# Patient Record
Sex: FEMALE | Race: WHITE | NOT HISPANIC OR LATINO | Employment: OTHER | URBAN - METROPOLITAN AREA
[De-identification: names, ages, dates, MRNs, and addresses within clinical notes are randomized per-mention and may not be internally consistent; named-entity substitution may affect disease eponyms.]

---

## 2024-03-26 ENCOUNTER — HOSPITAL ENCOUNTER (OUTPATIENT)
Dept: RADIOLOGY | Facility: HOSPITAL | Age: 64
Discharge: HOME | End: 2024-03-26
Payer: MEDICARE

## 2024-03-26 DIAGNOSIS — Z78.0 ASYMPTOMATIC MENOPAUSAL STATE: ICD-10-CM

## 2024-03-26 PROCEDURE — 77085 DXA BONE DENSITY AXL VRT FX: CPT | Performed by: RADIOLOGY

## 2024-03-26 PROCEDURE — 77085 DXA BONE DENSITY AXL VRT FX: CPT

## 2024-05-29 ENCOUNTER — HOSPITAL ENCOUNTER (OUTPATIENT)
Dept: RADIOLOGY | Facility: HOSPITAL | Age: 64
Discharge: HOME | End: 2024-05-29
Payer: MEDICARE

## 2024-05-29 DIAGNOSIS — Z13.6 ENCOUNTER FOR SCREENING FOR CARDIOVASCULAR DISORDERS: ICD-10-CM

## 2024-05-29 PROCEDURE — 75571 CT HRT W/O DYE W/CA TEST: CPT

## 2025-03-06 ENCOUNTER — HOSPITAL ENCOUNTER (OUTPATIENT)
Dept: RADIOLOGY | Facility: EXTERNAL LOCATION | Age: 65
Discharge: HOME | End: 2025-03-06

## 2025-03-07 ENCOUNTER — APPOINTMENT (OUTPATIENT)
Dept: RADIOLOGY | Facility: HOSPITAL | Age: 65
End: 2025-03-07
Payer: MEDICARE

## 2025-04-16 ENCOUNTER — HOSPITAL ENCOUNTER (OUTPATIENT)
Dept: RADIOLOGY | Facility: HOSPITAL | Age: 65
Discharge: HOME | End: 2025-04-16
Payer: MEDICARE

## 2025-04-16 VITALS — BODY MASS INDEX: 30.99 KG/M2 | HEIGHT: 65 IN | WEIGHT: 186 LBS

## 2025-04-16 DIAGNOSIS — N63.24 UNSPECIFIED LUMP IN THE LEFT BREAST, LOWER INNER QUADRANT: ICD-10-CM

## 2025-04-16 PROCEDURE — 77062 BREAST TOMOSYNTHESIS BI: CPT

## 2025-04-16 PROCEDURE — 76982 USE 1ST TARGET LESION: CPT | Mod: LT

## 2025-04-16 PROCEDURE — 76642 ULTRASOUND BREAST LIMITED: CPT | Mod: LT

## 2025-04-21 NOTE — PROGRESS NOTES
Subjective   Patient ID: Jacklyn Faria is a 64 y.o. female who presents for Left US breast biopsy consult, bx on 4/25.  HPI  Patient is new to clinic and presents for Left US breast biopsy consult, bx on 4/25.  Patient is referred by Dr. Lauren Bhagat.  Patient seen Dr. Bhagat in the office on 3/25/2025 for annual exam. She has identified a palpable mass in her breast, which she suspects might be a cyst. The mass is particularly noticeable when she assumes a lateral position. Additionally, she has detected several smaller masses, the nature of which she is uncertain. She has undergone numerous breast examinations in the past, with no prior mention of an inverted nipple. The breast exam revealed bilateral flat nipples. A mass is present in the lower inner quadrant of the right breast at the 6 o'clock position. Orders were placed for imaging to be completed.  Patient had BI mammogram bilateral diagnostic tomosynthesis and BI US breast limited left completed on 4/16/2025. Impression was suspicious palpable left breast mass with possible extension to the skin associated with a single prominent internal mammary lymph node without axillary lymphadenopathy. Further evaluation with surgical consultation and ultrasound-guided biopsy is recommended. No mammographic evidence of malignancy in the right breast.   Patient indicates she first felt the lump in February of this year.  She has had no previous breast biopsies.  She does not think the lump is changed since she first noticed it.  No nipple discharge.  BI MAMMO BILATERAL DIAGNOSTIC TOMOSYNTHESIS; BI US BREAST LIMITED  LEFT;  4/16/2025 2:42 pm; 4/16/2025 3:37 pm      ORDERING CLINICIAN:  LAUREN BHAGAT      INDICATION:  Diagnostic evaluation of palpable area of clinical concern in the  left breast felt by the patient for 2 months.      ,N63.24 Unspecified lump in the left breast, lower inner quadrant      COMPARISON:  10/12/2020, 09/24/2020, 01/18/2008, 12/18/2007.       FINDINGS:  MAMMOGRAPHY: 2D and tomosynthesis images were reviewed at 1 mm slice  thickness.      Density:  There are scattered areas of fibroglandular density.      A radiopaque marker was placed by the patient on the skin at the site  of the palpable clinical concern in the lower inner quadrant of the  left breast at posterior depth. Underneath the marker, there is an  irregular spiculated high density mass in lower inner left breast at  posterior depth, in the inframammary region. The spiculations of the  mass likely extends to the skin, best appreciated on the spot MLO  view. The most posterior extent of the mass is not well visualized  due to posterior location.      Additional a few bilateral oval circumscribed masses are similar to  prior exams including a stable mass in the right subareolar region.  No suspicious masses or calcifications are identified in the right  breast.      ULTRASOUND: Targeted ultrasound with elastography was performed by a  registered sonographer and the interpreting radiologist in the lower  inner quadrant of the left breast, left axilla and left internal  mammary region.      An irregular ill-defined spiculated hypoechoic mass with echogenic  rind in antiparallel orientation is identified in the left breast at  7 o'clock, 8 cm from the nipple measuring 1.3 x 1.5 x 1.1 cm. The  mass extends to the skin with no focal skin thickness.      On scanning of the left axilla, a single morphologically normal lymph  node with preserved fatty hilum and normal cortical thickness is  identified.      On scanning of the internal mammary region, a prominent internal  mammary lymph node is identified measuring 0.6 cm in long axis with  slightly effaced fatty hilum. No additional abnormal internal mammary  lymph nodes identified.      IMPRESSION:  1. Suspicious palpable left breast mass with possible extension to  the skin associated with a single prominent internal mammary lymph  node without  axillary lymphadenopathy. Further evaluation with  surgical consultation and ultrasound-guided biopsy is recommended.      2. No mammographic evidence of malignancy in the right breast.      Dr. Ted Donato discussed the findings and recommendations with  the patient at the time of exam. A message was sent to the referring  clinician at the time of this dictation regarding these findings  using the Epic critical findings reporting system. A pre-procedure  form was filled out.      Method of Detection: Category Pat - Patient Reported Self-examination  Finding      BI-RADS CATEGORY:  BI-RADS Category:  4 Suspicious.  Recommendation:  Surgical Consultation and Biopsy.  Recommended Date:  Immediate.  Laterality:  Left.      MACRO:  Critical Finding:  Breast Imaging Abnormality. Notification was  initiated on 4/16/2025 at 3:34 pm by  Ted Donato.  (**-YCF-**)  Instructions:  Surgical Consultation and Imaging Guided Biopsy.    Previous Biopsy: NO Menarche Age: 12/15 Age of first delivery: 19.5 Breastfeed: YES Menopause age: 47 HRT: NO Family history of breast cancer: MATERNAL AUNT Family history of ovarian cancer: NO Family history of pancreatic cancer: NO G 2 P 2     Social History:  Tobacco Use - NO  Do you use any recreational drugs - NO  Alcohol Use - NO  Caffeine Intake - YES  Working - RETIRED  Marital status -   Living with -  SPOUSE    Past Medical History:   Diagnosis Date    Breast mass Feb 2025    Colon polyp Jul 2020     Family History   Problem Relation Name Age of Onset    Kidney disease Father Darius Flores     Hyperlipidemia Father Darius Tanner     Heart disease Father Darius Tanner     Blood clot Father Darius Tanner     Alcohol abuse Maternal Grandfather Shimon Esqueda     Cancer Maternal Grandmother Sara Hernandez     Breast cancer Mother's Sister Deborah Danae     Clotting disorder Sister Ashley Byrne     Blood clot Sister Ashley Byrne     Clotting disorder Sister Shania Aguirre     Blood clot Sister  "Shania Aguirre     Blood clot Sister Corinne Abbott     Diabetes Father's Sister Rama Dave     Stroke Father's Sister Rama Dave     Vision loss Father's Sister Rama Dave     Miscarriages / Stillbirths Daughter Bonnie Roman      Past Surgical History:   Procedure Laterality Date    VAGINAL PROLAPSE REPAIR  2012     No Known Allergies    Review of Systems  /75 (BP Location: Right arm, Patient Position: Sitting, BP Cuff Size: Adult)   Pulse 63   Temp 36.7 °C (98.1 °F) (Temporal)   Resp 17   Ht 1.651 m (5' 5\")   Wt 84.4 kg (186 lb)   LMP  (LMP Unknown)   SpO2 100%   BMI 30.95 kg/m²    Objective   Physical Exam  Physical Exam:  /75 (BP Location: Right arm, Patient Position: Sitting, BP Cuff Size: Adult)   Pulse 63   Temp 36.7 °C (98.1 °F) (Temporal)   Resp 17   Ht 1.651 m (5' 5\")   Wt 84.4 kg (186 lb)   LMP  (LMP Unknown)   SpO2 100%   BMI 30.95 kg/m²    GENERAL APPEARANCE: Patient appears in no acute distress.   EYES: Sclera non-icteric, PERRLA.   ENT Normal appearance of ears and nose.   NECK/THYROID: Neck: no masses. Thyroid: no masses.   LYMPH NODES: No cervical or supraclavicular lymphadenopathy.   CARDIOVASCULAR Heart: RRR, no murmurs; Carotid bruits: none; Peripheral edema: none.   RESPIRATORY: Lungs: Bilateral inspiratory and expiratory wheezing; no respiratory distress.   BREASTS: Exam done both in upright and supine position. On inspection no skin dimpling, no peau d'orange; Masses: Patient with hard palpable mass left breast lower inner quadrant close to the inframammary crease and lateral border of the sternum.  It is not fixed..  Axillary lymphadenopathy: none.   GI (ABDOMEN) No intraabdominal mass appreciated, no hepatosplenomegaly; Hernia: none; Tenderness: none.   PSYCH: Patient oriented to time, place and person, normal affect.    Assessment/Plan   Problem List Items Addressed This Visit           ICD-10-CM    Mass of lower inner quadrant of left breast - Primary " N63.24    Patient with a 1.5 cm mass left breast lower inner quadrant recommending ultrasound-guided core biopsy. Patient to be scheduled for ultrasound guided  biopsy of the breast in the radiology department. Risk, benefits and alternatives to this plan were thoroughly discussed with the patient who agrees to proceed.  The procedure was also thoroughly discussed as well as her follow-up. She is to see me in the office in one week but I also will call as soon as I see the pathology which is usually 4 working days from procedure.                  Margarette Webb MA 04/24/25 2:23 PM

## 2025-04-24 ENCOUNTER — OFFICE VISIT (OUTPATIENT)
Dept: SURGERY | Facility: CLINIC | Age: 65
End: 2025-04-24
Payer: MEDICARE

## 2025-04-24 VITALS
WEIGHT: 186 LBS | BODY MASS INDEX: 30.99 KG/M2 | TEMPERATURE: 98.1 F | SYSTOLIC BLOOD PRESSURE: 141 MMHG | HEART RATE: 63 BPM | DIASTOLIC BLOOD PRESSURE: 75 MMHG | OXYGEN SATURATION: 100 % | RESPIRATION RATE: 17 BRPM | HEIGHT: 65 IN

## 2025-04-24 DIAGNOSIS — N63.24 MASS OF LOWER INNER QUADRANT OF LEFT BREAST: Primary | ICD-10-CM

## 2025-04-24 PROCEDURE — 1159F MED LIST DOCD IN RCRD: CPT | Performed by: SURGERY

## 2025-04-24 PROCEDURE — 99214 OFFICE O/P EST MOD 30 MIN: CPT | Performed by: SURGERY

## 2025-04-24 PROCEDURE — 3008F BODY MASS INDEX DOCD: CPT | Performed by: SURGERY

## 2025-04-24 PROCEDURE — 1036F TOBACCO NON-USER: CPT | Performed by: SURGERY

## 2025-04-24 PROCEDURE — 1157F ADVNC CARE PLAN IN RCRD: CPT | Performed by: SURGERY

## 2025-04-24 PROCEDURE — 1126F AMNT PAIN NOTED NONE PRSNT: CPT | Performed by: SURGERY

## 2025-04-24 PROCEDURE — 99204 OFFICE O/P NEW MOD 45 MIN: CPT | Performed by: SURGERY

## 2025-04-24 ASSESSMENT — LIFESTYLE VARIABLES
HOW OFTEN DO YOU HAVE A DRINK CONTAINING ALCOHOL: NEVER
HOW OFTEN DO YOU HAVE SIX OR MORE DRINKS ON ONE OCCASION: NEVER
HOW MANY STANDARD DRINKS CONTAINING ALCOHOL DO YOU HAVE ON A TYPICAL DAY: PATIENT DOES NOT DRINK
SKIP TO QUESTIONS 9-10: 1
AUDIT-C TOTAL SCORE: 0

## 2025-04-24 ASSESSMENT — PAIN SCALES - GENERAL: PAINLEVEL_OUTOF10: 0-NO PAIN

## 2025-04-24 ASSESSMENT — PATIENT HEALTH QUESTIONNAIRE - PHQ9
SUM OF ALL RESPONSES TO PHQ9 QUESTIONS 1 & 2: 0
1. LITTLE INTEREST OR PLEASURE IN DOING THINGS: NOT AT ALL
2. FEELING DOWN, DEPRESSED OR HOPELESS: NOT AT ALL

## 2025-04-24 ASSESSMENT — COLUMBIA-SUICIDE SEVERITY RATING SCALE - C-SSRS
2. HAVE YOU ACTUALLY HAD ANY THOUGHTS OF KILLING YOURSELF?: NO
1. IN THE PAST MONTH, HAVE YOU WISHED YOU WERE DEAD OR WISHED YOU COULD GO TO SLEEP AND NOT WAKE UP?: NO
6. HAVE YOU EVER DONE ANYTHING, STARTED TO DO ANYTHING, OR PREPARED TO DO ANYTHING TO END YOUR LIFE?: NO

## 2025-04-24 ASSESSMENT — ENCOUNTER SYMPTOMS
LOSS OF SENSATION IN FEET: 0
DEPRESSION: 0
OCCASIONAL FEELINGS OF UNSTEADINESS: 0

## 2025-04-24 NOTE — ASSESSMENT & PLAN NOTE
Patient with a 1.5 cm mass left breast lower inner quadrant recommending ultrasound-guided core biopsy. Patient to be scheduled for ultrasound guided  biopsy of the breast in the radiology department. Risk, benefits and alternatives to this plan were thoroughly discussed with the patient who agrees to proceed.  The procedure was also thoroughly discussed as well as her follow-up. She is to see me in the office in one week but I also will call as soon as I see the pathology which is usually 4 working days from procedure.

## 2025-04-25 ENCOUNTER — HOSPITAL ENCOUNTER (OUTPATIENT)
Dept: RADIOLOGY | Facility: CLINIC | Age: 65
Discharge: HOME | End: 2025-04-25
Payer: MEDICARE

## 2025-04-25 DIAGNOSIS — N63.0 BREAST MASS: Primary | ICD-10-CM

## 2025-04-25 DIAGNOSIS — N63.24 MASS OF LOWER INNER QUADRANT OF LEFT BREAST: ICD-10-CM

## 2025-04-25 DIAGNOSIS — R92.8 OTHER ABNORMAL AND INCONCLUSIVE FINDINGS ON DIAGNOSTIC IMAGING OF BREAST: ICD-10-CM

## 2025-04-25 DIAGNOSIS — N63.0 BREAST MASS: ICD-10-CM

## 2025-04-25 PROCEDURE — 2720000007 HC OR 272 NO HCPCS

## 2025-04-25 PROCEDURE — 2500000004 HC RX 250 GENERAL PHARMACY W/ HCPCS (ALT 636 FOR OP/ED): Mod: JZ | Performed by: STUDENT IN AN ORGANIZED HEALTH CARE EDUCATION/TRAINING PROGRAM

## 2025-04-25 PROCEDURE — 19083 BX BREAST 1ST LESION US IMAG: CPT | Mod: LT

## 2025-04-25 PROCEDURE — C1819 TISSUE LOCALIZATION-EXCISION: HCPCS

## 2025-04-25 PROCEDURE — 2780000003 HC OR 278 NO HCPCS

## 2025-04-25 PROCEDURE — C1739 HC OR 272 NO HCPCS: HCPCS

## 2025-04-25 PROCEDURE — 2500000004 HC RX 250 GENERAL PHARMACY W/ HCPCS (ALT 636 FOR OP/ED): Performed by: RADIOLOGY

## 2025-04-25 RX ORDER — CHLOROPROCAINE HYDROCHLORIDE 20 MG/ML
7.5 INJECTION, SOLUTION EPIDURAL; INFILTRATION; INTRACAUDAL; PERINEURAL ONCE
Status: COMPLETED | OUTPATIENT
Start: 2025-04-25 | End: 2025-04-25

## 2025-04-25 RX ADMIN — CHLOROPROCAINE HYDROCHLORIDE 150 MG: 20 INJECTION, SOLUTION EPIDURAL; INFILTRATION; INTRACAUDAL; PERINEURAL at 14:25

## 2025-04-25 ASSESSMENT — PAIN SCALES - GENERAL
PAINLEVEL_OUTOF10: 0 - NO PAIN

## 2025-04-25 ASSESSMENT — PAIN - FUNCTIONAL ASSESSMENT
PAIN_FUNCTIONAL_ASSESSMENT: 0-10

## 2025-04-30 LAB
LAB AP ASR DISCLAIMER: NORMAL
LAB AP BLOCK FOR ADDITIONAL STUDIES: NORMAL
LABORATORY COMMENT REPORT: NORMAL
PATH REPORT.FINAL DX SPEC: NORMAL
PATH REPORT.GROSS SPEC: NORMAL
PATH REPORT.RELEVANT HX SPEC: NORMAL
PATH REPORT.TOTAL CANCER: NORMAL
PATHOLOGY SYNOPTIC REPORT: NORMAL

## 2025-05-05 ENCOUNTER — TELEPHONE (OUTPATIENT)
Dept: SURGERY | Facility: CLINIC | Age: 65
End: 2025-05-05
Payer: MEDICARE

## 2025-05-06 NOTE — PROGRESS NOTES
"Subjective   Patient ID: Jacklyn Faria \"Kb" is a 64 y.o. female who presents for discussion of Left US breast biopsy results, bx on 4/25.  HPI  Patient returns to clinic and presents for discussion of Left US breast biopsy results, bx on 4/25.  Patient was last seen in clinic on 4/24/2025 for Left US breast biopsy consult, bx on 4/25.   Surgical pathology was collected and finalized.    FINAL DIAGNOSIS   A. Left breast, 7:00, 8 cm from nipple, ultrasound guided core needle biopsy:      -- Invasive ductal carcinoma with mucinous features, grade 2, see note.     Note:  In this limited sample, the invasive carcinoma measures up to 9 mm in greatest dimension.  See synoptic biomarker summary.     : Dr Debora Martin     Electronically signed by Mary Hammond MD on 4/30/2025 at 1404 EDT      [facility]   By the signature on this report, the individual or group listed as making the Final Interpretation/Diagnosis certifies that they have reviewed this case.    Case Summary Report   Breast Biomarker Reporting Template   Protocol posted: 12/13/2023BREAST BIOMARKER REPORTING TEMPLATE - All Specimens  Test(s) Performed     Estrogen Receptor (ER) Status  Positive (greater than 10% of cells demonstrate nuclear positivity)   Percentage of Cells with Nuclear Positivity  %   Average Intensity of Staining  Strong   Test Type  Food and Drug Administration (FDA) cleared (test / vendor): Roche CONFIRM anti-(ER) (SP1) Rabbit Monoclonal Primary Antibody, Roche Multimer Detection   Primary Antibody  SP1   Scoring System  No separate scoring system used   Test(s) Performed     Progesterone Receptor (PgR) Status  Positive   Percentage of Cells with Nuclear Positivity  %   Average Intensity of Staining  Strong   Test Type  Food and Drug Administration (FDA) cleared (test / vendor): Roche CONFIRM anti-(PA) (1E2) Rabbit Monoclonal Primary Anitbody, Roche Multimer Detection   Primary Antibody  1E2   Scoring " System  No separate scoring system used   Test(s) Performed     HER2 by Immunohistochemistry  Negative (Score 1+)   Test Type  Food and Drug Administration (FDA) cleared (test / vendor): Roche Pathway anti-HER-2/henry (4B5) Rabbit Monoclonal Primary Antibody, Roche Multimer Detection   Primary Antibody  4B5   Cold Ischemia and Fixation Times  Meet requirements specified in latest version of the ASCO / CAP Guidelines   Testing Performed on Block Number(s)  A1   METHODS   Fixative  Formalin   Image Analysis  Not performed   .      Block for Additional Biomarkers/Molecular Studies  [facility]   Tumor Block: A1         Social History:  Tobacco Use - NO  Do you use any recreational drugs - NO  Alcohol Use - NO  Caffeine Intake - YES  Working - RETIRED  Marital status -   Living with -  SPOUSE    Past Medical History:   Diagnosis Date    Breast mass Feb 2025    Colon polyp Jul 2020     Family History   Problem Relation Name Age of Onset    Kidney disease Father Darius Tanner     Hyperlipidemia Father Darius Tanner     Heart disease Father Darius Tanner     Blood clot Father Darius Tanner     Alcohol abuse Maternal Grandfather Shimon Esqueda     Cancer Maternal Grandmother Sara Hernandez     Breast cancer Mother's Sister Deborah Fink     Clotting disorder Sister Ashley Byrne     Blood clot Sister Ashley Byrne     Clotting disorder Sister Shania Aguirre     Blood clot Sister Shania Aguirre     Blood clot Sister Corinne Abbott     Diabetes Father's Sister Rama Dave     Stroke Father's Sister Rama Dave     Vision loss Father's Sister Rama Jolie     Miscarriages / Stillbirths Daughter Bonnie Roman      Past Surgical History:   Procedure Laterality Date    US BREAST CORE BIOPSY Left 04/24/2025    VAGINAL PROLAPSE REPAIR  2012     Allergies   Allergen Reactions    Lidocaine Other     Per pt had racing heart rate after given at dentist    Other Reaction(s): Other      Per pt had racing heart rate after given at dentist       Review  "of Systems  /70 (BP Location: Left arm, Patient Position: Sitting, BP Cuff Size: Adult)   Pulse 75   Temp 36.7 °C (98 °F) (Temporal)   Resp 17   Ht 1.651 m (5' 5\")   Wt 84.4 kg (186 lb)   LMP  (LMP Unknown)   SpO2 99%   BMI 30.95 kg/m²    Objective   Physical Exam  Biopsy site left breast healed nicely  Assessment/Plan   Problem List Items Addressed This Visit           ICD-10-CM    Invasive ductal carcinoma of breast, female, left - Primary C50.912    Patient with 1.5 cm invasive ductal carcinoma that is ER/CT positive HER2/henry negative grade 2 out of 3 in the left breast lower medial quadrant. Recommend magseed localization and lumpectomy with sentinal node biopsy verses mastectomy +/- reconstruction with sentinal node biopsy. Approximately 40 minutes spent with patient discussing her tumor, its characteristics and her treatment options. She will ultimately meet with oncology post surgery, and  radiation oncology depending on her surgical treatment choice and its outcome.  Risks benefits and alternatives to surgery were thoroughly discussed with the patient who agrees to proceed.  Patient is agreeable to proceed with the Magseed localization lumpectomy and sentinel node biopsy.         Family history of malignant neoplasm of breast Z80.3    Patient is a candidate for genetic testing.             Breast History:    Patient is new to clinic and presents for Left US breast biopsy consult, bx on 4/25.  Patient is referred by Dr. Lauren Bhagat.  Patient seen Dr. Bhagat in the office on 3/25/2025 for annual exam. She has identified a palpable mass in her breast, which she suspects might be a cyst. The mass is particularly noticeable when she assumes a lateral position. Additionally, she has detected several smaller masses, the nature of which she is uncertain. She has undergone numerous breast examinations in the past, with no prior mention of an inverted nipple. The breast exam revealed bilateral flat " nipples. A mass is present in the lower inner quadrant of the right breast at the 6 o'clock position. Orders were placed for imaging to be completed.  Patient had BI mammogram bilateral diagnostic tomosynthesis and BI US breast limited left completed on 4/16/2025. Impression was suspicious palpable left breast mass with possible extension to the skin associated with a single prominent internal mammary lymph node without axillary lymphadenopathy. Further evaluation with surgical consultation and ultrasound-guided biopsy is recommended. No mammographic evidence of malignancy in the right breast.   Patient indicates she first felt the lump in February of this year.  She has had no previous breast biopsies.  She does not think the lump is changed since she first noticed it.  No nipple discharge.  Patient with a 1.5 cm mass left breast lower inner quadrant recommending ultrasound-guided core biopsy. Patient to be scheduled for ultrasound guided  biopsy of the breast in the radiology department. Risk, benefits and alternatives to this plan were thoroughly discussed with the patient who agrees to proceed.  The procedure was also thoroughly discussed as well as her follow-up. She is to see me in the office in one week but I also will call as soon as I see the pathology which is usually 4 working days from procedure.   Previous Biopsy: NO Menarche Age: 12/15 Age of first delivery: 19.5 Breastfeed: YES Menopause age: 47 HRT: NO Family history of breast cancer: MATERNAL AUNT Family history of ovarian cancer: NO Family history of pancreatic cancer: NO G 2 P 2     Margarette Webb MA 05/12/25 3:10 PM

## 2025-05-12 ENCOUNTER — OFFICE VISIT (OUTPATIENT)
Dept: SURGERY | Facility: CLINIC | Age: 65
End: 2025-05-12
Payer: MEDICARE

## 2025-05-12 VITALS
OXYGEN SATURATION: 99 % | HEART RATE: 75 BPM | DIASTOLIC BLOOD PRESSURE: 70 MMHG | WEIGHT: 186 LBS | TEMPERATURE: 98 F | SYSTOLIC BLOOD PRESSURE: 121 MMHG | RESPIRATION RATE: 17 BRPM | HEIGHT: 65 IN | BODY MASS INDEX: 30.99 KG/M2

## 2025-05-12 DIAGNOSIS — C50.912 INVASIVE DUCTAL CARCINOMA OF BREAST, FEMALE, LEFT: Primary | ICD-10-CM

## 2025-05-12 DIAGNOSIS — Z80.3 FAMILY HISTORY OF MALIGNANT NEOPLASM OF BREAST: ICD-10-CM

## 2025-05-12 PROCEDURE — 1126F AMNT PAIN NOTED NONE PRSNT: CPT | Performed by: SURGERY

## 2025-05-12 PROCEDURE — 99214 OFFICE O/P EST MOD 30 MIN: CPT | Performed by: SURGERY

## 2025-05-12 PROCEDURE — 1036F TOBACCO NON-USER: CPT | Performed by: SURGERY

## 2025-05-12 PROCEDURE — 3008F BODY MASS INDEX DOCD: CPT | Performed by: SURGERY

## 2025-05-12 PROCEDURE — 1159F MED LIST DOCD IN RCRD: CPT | Performed by: SURGERY

## 2025-05-12 RX ORDER — CEFAZOLIN SODIUM 2 G/100ML
2 INJECTION, SOLUTION INTRAVENOUS ONCE
OUTPATIENT
Start: 2025-05-12 | End: 2025-05-12

## 2025-05-12 ASSESSMENT — COLUMBIA-SUICIDE SEVERITY RATING SCALE - C-SSRS
1. IN THE PAST MONTH, HAVE YOU WISHED YOU WERE DEAD OR WISHED YOU COULD GO TO SLEEP AND NOT WAKE UP?: NO
6. HAVE YOU EVER DONE ANYTHING, STARTED TO DO ANYTHING, OR PREPARED TO DO ANYTHING TO END YOUR LIFE?: NO
2. HAVE YOU ACTUALLY HAD ANY THOUGHTS OF KILLING YOURSELF?: NO

## 2025-05-12 ASSESSMENT — ENCOUNTER SYMPTOMS
LOSS OF SENSATION IN FEET: 0
OCCASIONAL FEELINGS OF UNSTEADINESS: 0
DEPRESSION: 0

## 2025-05-12 ASSESSMENT — LIFESTYLE VARIABLES
SKIP TO QUESTIONS 9-10: 1
AUDIT-C TOTAL SCORE: 0
HOW MANY STANDARD DRINKS CONTAINING ALCOHOL DO YOU HAVE ON A TYPICAL DAY: PATIENT DOES NOT DRINK
HOW OFTEN DO YOU HAVE A DRINK CONTAINING ALCOHOL: NEVER
HOW OFTEN DO YOU HAVE SIX OR MORE DRINKS ON ONE OCCASION: NEVER

## 2025-05-12 ASSESSMENT — PATIENT HEALTH QUESTIONNAIRE - PHQ9
1. LITTLE INTEREST OR PLEASURE IN DOING THINGS: NOT AT ALL
2. FEELING DOWN, DEPRESSED OR HOPELESS: NOT AT ALL
SUM OF ALL RESPONSES TO PHQ9 QUESTIONS 1 & 2: 0

## 2025-05-12 ASSESSMENT — PAIN SCALES - GENERAL: PAINLEVEL_OUTOF10: 0-NO PAIN

## 2025-05-12 NOTE — ASSESSMENT & PLAN NOTE
Patient with 1.5 cm invasive ductal carcinoma that is ER/RI positive HER2/henry negative grade 2 out of 3 in the left breast lower medial quadrant. Recommend magseed localization and lumpectomy with sentinal node biopsy verses mastectomy +/- reconstruction with sentinal node biopsy. Approximately 40 minutes spent with patient discussing her tumor, its characteristics and her treatment options. She will ultimately meet with oncology post surgery, and  radiation oncology depending on her surgical treatment choice and its outcome.  Risks benefits and alternatives to surgery were thoroughly discussed with the patient who agrees to proceed.  Patient is agreeable to proceed with the Magseed localization lumpectomy and sentinel node biopsy.

## 2025-05-13 ENCOUNTER — LAB (OUTPATIENT)
Dept: LAB | Facility: HOSPITAL | Age: 65
End: 2025-05-13
Payer: MEDICARE

## 2025-05-13 DIAGNOSIS — Z80.2 FAMILY HISTORY OF MALIGNANT NEOPLASM OF OTHER RESPIRATORY AND INTRATHORACIC ORGANS: Primary | ICD-10-CM

## 2025-05-22 DIAGNOSIS — C50.912 INVASIVE DUCTAL CARCINOMA OF BREAST, FEMALE, LEFT: Primary | ICD-10-CM

## 2025-06-02 VITALS — BODY MASS INDEX: 29.16 KG/M2 | WEIGHT: 175 LBS | HEIGHT: 65 IN

## 2025-06-04 ENCOUNTER — HOSPITAL ENCOUNTER (OUTPATIENT)
Dept: RADIOLOGY | Facility: CLINIC | Age: 65
Discharge: HOME | End: 2025-06-04
Payer: MEDICARE

## 2025-06-04 DIAGNOSIS — C50.912 INVASIVE DUCTAL CARCINOMA OF BREAST, FEMALE, LEFT: ICD-10-CM

## 2025-06-04 DIAGNOSIS — N63.20 LEFT BREAST MASS: ICD-10-CM

## 2025-06-04 PROCEDURE — 2780000003 HC OR 278 NO HCPCS

## 2025-06-04 PROCEDURE — 19285 PERQ DEV BREAST 1ST US IMAG: CPT | Mod: LT

## 2025-06-04 PROCEDURE — C1739 HC OR 278 NO HCPCS: HCPCS

## 2025-06-04 PROCEDURE — 2500000004 HC RX 250 GENERAL PHARMACY W/ HCPCS (ALT 636 FOR OP/ED): Performed by: RADIOLOGY

## 2025-06-04 RX ORDER — CHLOROPROCAINE HYDROCHLORIDE 20 MG/ML
10 INJECTION, SOLUTION EPIDURAL; INFILTRATION; INTRACAUDAL; PERINEURAL ONCE
Status: COMPLETED | OUTPATIENT
Start: 2025-06-04 | End: 2025-06-04

## 2025-06-04 RX ADMIN — CHLOROPROCAINE HYDROCHLORIDE 100 MG: 20 INJECTION, SOLUTION EPIDURAL; INFILTRATION; INTRACAUDAL; PERINEURAL at 08:19

## 2025-06-04 ASSESSMENT — PAIN SCALES - GENERAL
PAINLEVEL_OUTOF10: 0 - NO PAIN
PAINLEVEL_OUTOF10: 1
PAINLEVEL_OUTOF10: 0 - NO PAIN

## 2025-06-04 NOTE — Clinical Note
Patient expressed anxiety regarding procedure.  Patient's surgery is scheduled for August.  Nurse and tech provided active listening and supportive feedback and reassurance.  Patient given option to reschedule procedure for another day.  Patient expressed appreciation for support and stated she would like to continue with magseed procedure.  All questions answered.

## 2025-06-04 NOTE — DISCHARGE INSTRUCTIONS
Post procedure care reviewed with patient, ok to resume normal activity with no restrictions. Patient verbalized understanding and will follow up surgery as planned.   Patient left breast center ambulatory at 0830.

## 2025-06-11 ENCOUNTER — TELEPHONE (OUTPATIENT)
Dept: SURGERY | Facility: CLINIC | Age: 65
End: 2025-06-11
Payer: MEDICARE

## 2025-06-11 NOTE — TELEPHONE ENCOUNTER
Called [employer] insurance today in regards to fax that was received in regards to this patients genetic testing being denied. Had to leave a voicemail for Marlo in regards to this matter. Dr. Selby states she is not going to do a peer to peer since this should be covered due to patients family history and her own history of breast cancer. Will wait for a call back from Marlo from [employer] in regards to this matter.    Phoebe Gaviria CMA

## 2025-06-18 ENCOUNTER — PATIENT MESSAGE (OUTPATIENT)
Dept: SURGERY | Facility: CLINIC | Age: 65
End: 2025-06-18
Payer: MEDICARE

## 2025-07-21 ENCOUNTER — PRE-ADMISSION TESTING (OUTPATIENT)
Dept: PREADMISSION TESTING | Facility: HOSPITAL | Age: 65
End: 2025-07-21
Payer: MEDICARE

## 2025-07-21 VITALS
HEART RATE: 63 BPM | BODY MASS INDEX: 27.99 KG/M2 | OXYGEN SATURATION: 99 % | WEIGHT: 168 LBS | SYSTOLIC BLOOD PRESSURE: 111 MMHG | DIASTOLIC BLOOD PRESSURE: 63 MMHG | TEMPERATURE: 96.8 F | HEIGHT: 65 IN | RESPIRATION RATE: 16 BRPM

## 2025-07-21 DIAGNOSIS — Z01.818 PRE-OP TESTING: Primary | ICD-10-CM

## 2025-07-21 LAB
ANION GAP SERPL CALCULATED.3IONS-SCNC: 11 MMOL/L (ref 10–20)
BASOPHILS # BLD AUTO: 0.07 X10*3/UL (ref 0–0.1)
BASOPHILS NFR BLD AUTO: 1.3 %
BUN SERPL-MCNC: 14 MG/DL (ref 6–23)
CALCIUM SERPL-MCNC: 9.8 MG/DL (ref 8.6–10.3)
CHLORIDE SERPL-SCNC: 105 MMOL/L (ref 98–107)
CO2 SERPL-SCNC: 30 MMOL/L (ref 21–32)
CREAT SERPL-MCNC: 0.89 MG/DL (ref 0.5–1.05)
EGFRCR SERPLBLD CKD-EPI 2021: 72 ML/MIN/1.73M*2
EOSINOPHIL # BLD AUTO: 0.14 X10*3/UL (ref 0–0.7)
EOSINOPHIL NFR BLD AUTO: 2.5 %
ERYTHROCYTE [DISTWIDTH] IN BLOOD BY AUTOMATED COUNT: 14.6 % (ref 11.5–14.5)
GLUCOSE SERPL-MCNC: 99 MG/DL (ref 74–99)
HCT VFR BLD AUTO: 44.7 % (ref 36–46)
HGB BLD-MCNC: 14.3 G/DL (ref 12–16)
IMM GRANULOCYTES # BLD AUTO: 0.01 X10*3/UL (ref 0–0.7)
IMM GRANULOCYTES NFR BLD AUTO: 0.2 % (ref 0–0.9)
LYMPHOCYTES # BLD AUTO: 2.16 X10*3/UL (ref 1.2–4.8)
LYMPHOCYTES NFR BLD AUTO: 38.7 %
MCH RBC QN AUTO: 29.4 PG (ref 26–34)
MCHC RBC AUTO-ENTMCNC: 32 G/DL (ref 32–36)
MCV RBC AUTO: 92 FL (ref 80–100)
MONOCYTES # BLD AUTO: 0.51 X10*3/UL (ref 0.1–1)
MONOCYTES NFR BLD AUTO: 9.1 %
NEUTROPHILS # BLD AUTO: 2.69 X10*3/UL (ref 1.2–7.7)
NEUTROPHILS NFR BLD AUTO: 48.2 %
NRBC BLD-RTO: 0 /100 WBCS (ref 0–0)
PLATELET # BLD AUTO: 266 X10*3/UL (ref 150–450)
POTASSIUM SERPL-SCNC: 5.2 MMOL/L (ref 3.5–5.3)
RBC # BLD AUTO: 4.86 X10*6/UL (ref 4–5.2)
SODIUM SERPL-SCNC: 141 MMOL/L (ref 136–145)
WBC # BLD AUTO: 5.6 X10*3/UL (ref 4.4–11.3)

## 2025-07-21 PROCEDURE — 85025 COMPLETE CBC W/AUTO DIFF WBC: CPT

## 2025-07-21 PROCEDURE — 36415 COLL VENOUS BLD VENIPUNCTURE: CPT

## 2025-07-21 PROCEDURE — 99203 OFFICE O/P NEW LOW 30 MIN: CPT | Performed by: PHYSICIAN ASSISTANT

## 2025-07-21 PROCEDURE — 80048 BASIC METABOLIC PNL TOTAL CA: CPT

## 2025-07-21 RX ORDER — NALTREXONE HCL 4.5 MG
1 CAPSULE ORAL DAILY
COMMUNITY

## 2025-07-21 RX ORDER — VIT C/E/ZN/COPPR/LUTEIN/ZEAXAN 250MG-90MG
125 CAPSULE ORAL DAILY
COMMUNITY

## 2025-07-21 RX ORDER — MAGNESIUM OXIDE 420 MG/1
1 TABLET ORAL DAILY
COMMUNITY

## 2025-07-21 RX ORDER — MULTIVITAMIN/IRON/FOLIC ACID 18MG-0.4MG
1 TABLET ORAL DAILY
COMMUNITY

## 2025-07-21 ASSESSMENT — ENCOUNTER SYMPTOMS
NEUROLOGICAL NEGATIVE: 1
CARDIOVASCULAR NEGATIVE: 1
CONSTIPATION: 1
CONSTITUTIONAL NEGATIVE: 1
MUSCULOSKELETAL NEGATIVE: 1
RESPIRATORY NEGATIVE: 1
PSYCHIATRIC NEGATIVE: 1
EYES NEGATIVE: 1

## 2025-07-21 NOTE — H&P (VIEW-ONLY)
"CPM/PAT Evaluation       Name: Jacklyn Faria (Jacklyn Faria \"Pura\")  /Age: 1960/64 y.o.     In-Person       Chief Complaint:  \"having breast surgery\"    HPI  The patient is a 64 year old female.  She states she has had intermittent mammograms over the past several years.  Her last mammogram was in .  In  she felt a lump in the left breast.  She denies breast pain, swelling, nipple discharge or skin changes.  Her family history includes a maternal aunt who was diagnosed with breast cancer at age 59 and is living.   The patient saw her PCP and a mammogram showed a suspicious lesion in the left breast.  On 2025 a left breast biopsy showed invasive ductal carcinoma.  She was referred to Dr. Selby and surgical intervention is recommended at this time.    Past Medical History:   Diagnosis Date    Breast mass 2025    Cataract 2024    Beginning signs    Colon polyp 2020    Fractures 2022    Broken clavicle       Past Surgical History:   Procedure Laterality Date    BLADDER SUSPENSION  [place]     COLONOSCOPY  2025    UPPER GASTROINTESTINAL ENDOSCOPY  2020    Signs of GERD    US BREAST CORE BIOPSY Left 2025    VAGINAL PROLAPSE REPAIR  2012       Family History   Problem Relation Name Age of Onset    Kidney disease Father Darius Tanner 60 - 69    Hyperlipidemia Father Darius Tanner 40 - 49    Heart disease Father Darius Tanner 40 - 49    Blood clot Father Darius Tanner     Alcohol abuse Maternal Grandfather Shimon Esqueda     Cancer Maternal Grandmother Sara Hernandez 68    Breast cancer Mother's Sister Deborah Danae     Clotting disorder Sister Ashley Byrne     Blood clot Sister Ashley Chavez     Clotting disorder Sister Shania Timothy     Blood clot Sister Shania Timothy     Blood clot Sister Corinne Abbott     Diabetes Father's Sister Arma Jolie 70 - 79    Stroke Father's Sister Rama Brysonulk 70 - 79    Vision loss Father's Sister Rama Brysonulk 70 - 79    " "Miscarriages / Stillbirths Daughter Bonnie Roman 30 - 39    Cancer Mother's Sister Leticia Fink 59     Social History     Tobacco Use    Smoking status: Never     Passive exposure: Never    Smokeless tobacco: Never    Tobacco comments:     exposed to second hand smoke as a child   Substance Use Topics    Alcohol use: Never     Social History     Substance and Sexual Activity   Drug Use Never     Allergies   Allergen Reactions    Lidocaine Other     Per pt had racing heart rate after given at dentist    Other Reaction(s): Other      Per pt had racing heart rate after given at dentist       Current Outpatient Medications   Medication Sig Dispense Refill    b complex 0.4 mg tablet Take 1 tablet by mouth once daily.      cholecalciferol (Vitamin D-3) 125 mcg (5,000 units) capsule Take 1 capsule (125 mcg) by mouth once daily.      magnesium oxide (Mag-Ox) 420 mg tablet Take 1 tablet (420 mg) by mouth once daily.      naltrexone (Naltrex) 4.5 mg capsule Take 1 tablet by mouth once daily.      NON FORMULARY once daily. VITAMIN K2 300MG      NON FORMULARY once daily. PQQ SUPPLEMENT      OMEGA-3-DHA-EPA-DPA ORAL Take 1 capsule by mouth 2 times a day.      QUERCETIN ORAL Take 1 tablet by mouth 2 times a day.      SELENIUM ORAL Take 1 tablet by mouth once daily. 380MG      ZINC ORAL Take 1 tablet by mouth once daily. 30 MG       No current facility-administered medications for this visit.     Review of Systems   Constitutional: Negative.    HENT: Negative.     Eyes: Negative.    Respiratory: Negative.     Cardiovascular: Negative.    Gastrointestinal:  Positive for constipation.   Genitourinary: Negative.    Musculoskeletal: Negative.    Neurological: Negative.    Psychiatric/Behavioral: Negative.       /63   Pulse 63   Temp 36 °C (96.8 °F) (Temporal)   Resp 16   Ht 1.651 m (5' 5\")   Wt 76.2 kg (168 lb)   LMP  (LMP Unknown)   SpO2 99%   BMI 27.96 kg/m²     Physical Exam  Vitals reviewed.   Constitutional:       " Appearance: Normal appearance.   HENT:      Head: Normocephalic and atraumatic.      Mouth/Throat:      Mouth: Mucous membranes are moist.      Pharynx: Oropharynx is clear.     Eyes:      Extraocular Movements: Extraocular movements intact.      Pupils: Pupils are equal, round, and reactive to light.       Cardiovascular:      Rate and Rhythm: Normal rate and regular rhythm.      Heart sounds: Normal heart sounds.   Pulmonary:      Effort: Pulmonary effort is normal.      Breath sounds: Normal breath sounds.   Abdominal:      General: There is no distension.      Palpations: Abdomen is soft.     Musculoskeletal:         General: No swelling.     Skin:     General: Skin is warm and dry.     Neurological:      General: No focal deficit present.      Mental Status: She is alert and oriented to person, place, and time.     Psychiatric:         Mood and Affect: Mood normal.         Behavior: Behavior normal.          PAT AIRWAY:   Airway:     Mallampati::  IV    TM distance::  >3 FB    Neck ROM::  Full   Teeth intact    ASA:  II  DASI SCORE:  50.7  METS SCORE:  9  CHAD2 SCORE:  1.9%  REVISED CARDIAC RISK INDEX:  0.4%  STOP BANG SCORE:  2  CAPRINI DVT SCORE:  7  DANDRE SCORE:  0  ARISCAT SCORE:  1.6%    CBC, BMP ordered during PAT visit    Assessment and Plan:     Invasive ductal carcinoma of left female breast:  Left Magseed localization lumpectomy, left sentinel node biopsy    Gila Howell PA-C

## 2025-07-21 NOTE — CPM/PAT H&P
"CPM/PAT Evaluation       Name: Jacklyn Faria (Jacklyn Faria \"Pura\")  /Age: 1960/64 y.o.     In-Person       Chief Complaint:  \"having breast surgery\"    HPI  The patient is a 64 year old female.  She states she has had intermittent mammograms over the past several years.  Her last mammogram was in .  In  she felt a lump in the left breast.  She denies breast pain, swelling, nipple discharge or skin changes.  Her family history includes a maternal aunt who was diagnosed with breast cancer at age 59 and is living.   The patient saw her PCP and a mammogram showed a suspicious lesion in the left breast.  On 2025 a left breast biopsy showed invasive ductal carcinoma.  She was referred to Dr. Selby and surgical intervention is recommended at this time.    Past Medical History:   Diagnosis Date    Breast mass 2025    Cataract 2024    Beginning signs    Colon polyp 2020    Fractures 2022    Broken clavicle       Past Surgical History:   Procedure Laterality Date    BLADDER SUSPENSION  [place]     COLONOSCOPY  2025    UPPER GASTROINTESTINAL ENDOSCOPY  2020    Signs of GERD    US BREAST CORE BIOPSY Left 2025    VAGINAL PROLAPSE REPAIR  2012       Family History   Problem Relation Name Age of Onset    Kidney disease Father Darius Tanner 60 - 69    Hyperlipidemia Father Darius Tanner 40 - 49    Heart disease Father Darius Tanner 40 - 49    Blood clot Father Darius Tanner     Alcohol abuse Maternal Grandfather Shimon Esqueda     Cancer Maternal Grandmother Sara Hernandez 68    Breast cancer Mother's Sister Deborah Danae     Clotting disorder Sister Ashley Byrne     Blood clot Sister Ashley Chavez     Clotting disorder Sister Shania Timothy     Blood clot Sister Shania Timothy     Blood clot Sister Corinne Abbott     Diabetes Father's Sister Rama Jolie 70 - 79    Stroke Father's Sister Rama Brysonulk 70 - 79    Vision loss Father's Sister Rama Brysonulk 70 - 79    " "Miscarriages / Stillbirths Daughter Bonnie Roman 30 - 39    Cancer Mother's Sister Leticia Fink 59     Social History     Tobacco Use    Smoking status: Never     Passive exposure: Never    Smokeless tobacco: Never    Tobacco comments:     exposed to second hand smoke as a child   Substance Use Topics    Alcohol use: Never     Social History     Substance and Sexual Activity   Drug Use Never     Allergies   Allergen Reactions    Lidocaine Other     Per pt had racing heart rate after given at dentist    Other Reaction(s): Other      Per pt had racing heart rate after given at dentist       Current Outpatient Medications   Medication Sig Dispense Refill    b complex 0.4 mg tablet Take 1 tablet by mouth once daily.      cholecalciferol (Vitamin D-3) 125 mcg (5,000 units) capsule Take 1 capsule (125 mcg) by mouth once daily.      magnesium oxide (Mag-Ox) 420 mg tablet Take 1 tablet (420 mg) by mouth once daily.      naltrexone (Naltrex) 4.5 mg capsule Take 1 tablet by mouth once daily.      NON FORMULARY once daily. VITAMIN K2 300MG      NON FORMULARY once daily. PQQ SUPPLEMENT      OMEGA-3-DHA-EPA-DPA ORAL Take 1 capsule by mouth 2 times a day.      QUERCETIN ORAL Take 1 tablet by mouth 2 times a day.      SELENIUM ORAL Take 1 tablet by mouth once daily. 380MG      ZINC ORAL Take 1 tablet by mouth once daily. 30 MG       No current facility-administered medications for this visit.     Review of Systems   Constitutional: Negative.    HENT: Negative.     Eyes: Negative.    Respiratory: Negative.     Cardiovascular: Negative.    Gastrointestinal:  Positive for constipation.   Genitourinary: Negative.    Musculoskeletal: Negative.    Neurological: Negative.    Psychiatric/Behavioral: Negative.       /63   Pulse 63   Temp 36 °C (96.8 °F) (Temporal)   Resp 16   Ht 1.651 m (5' 5\")   Wt 76.2 kg (168 lb)   LMP  (LMP Unknown)   SpO2 99%   BMI 27.96 kg/m²     Physical Exam  Vitals reviewed.   Constitutional:       " Appearance: Normal appearance.   HENT:      Head: Normocephalic and atraumatic.      Mouth/Throat:      Mouth: Mucous membranes are moist.      Pharynx: Oropharynx is clear.     Eyes:      Extraocular Movements: Extraocular movements intact.      Pupils: Pupils are equal, round, and reactive to light.       Cardiovascular:      Rate and Rhythm: Normal rate and regular rhythm.      Heart sounds: Normal heart sounds.   Pulmonary:      Effort: Pulmonary effort is normal.      Breath sounds: Normal breath sounds.   Abdominal:      General: There is no distension.      Palpations: Abdomen is soft.     Musculoskeletal:         General: No swelling.     Skin:     General: Skin is warm and dry.     Neurological:      General: No focal deficit present.      Mental Status: She is alert and oriented to person, place, and time.     Psychiatric:         Mood and Affect: Mood normal.         Behavior: Behavior normal.          PAT AIRWAY:   Airway:     Mallampati::  IV    TM distance::  >3 FB    Neck ROM::  Full   Teeth intact    ASA:  II  DASI SCORE:  50.7  METS SCORE:  9  CHAD2 SCORE:  1.9%  REVISED CARDIAC RISK INDEX:  0.4%  STOP BANG SCORE:  2  CAPRINI DVT SCORE:  7  DANDRE SCORE:  0  ARISCAT SCORE:  1.6%    CBC, BMP ordered during PAT visit    Assessment and Plan:     Invasive ductal carcinoma of left female breast:  Left Magseed localization lumpectomy, left sentinel node biopsy    Gila Howell PA-C

## 2025-07-21 NOTE — PREPROCEDURE INSTRUCTIONS
Preoperative Fasting Guidelines    Why must I stop eating and drinking near surgery time?  With sedation, food or liquid in your stomach can enter your lungs causing serious complications  Increases nausea and vomiting    When do I need to stop eating and drinking before my surgery?  Do not eat any food after midnight the night before your surgery/procedure.  You may have up to 13.5 ounces of clear liquid until TWO hours before your instructed arrival time to the hospital.  This includes water, black tea/coffee, (no milk or cream) apple juice, and electrolyte drinks (Gatorade)  You may chew gum until TWO hours before your surgery/procedure      PAT DISCHARGE INSTRUCTIONS    The Same Day Surgery (SDS) Department of the hospital where your procedure will be performed will contact you after 2:00 PM the day before your surgery. If you are scheduled on a Monday, or a Tuesday following a Monday holiday, they will call on the last business day prior to your surgery.  Please check your voicemail for any missed messages.    [place] [id number] [phone]  Second Floor      Please let your surgeon know if:      You develop any open sores, shingles, burning or painful urination as these may increase your risk of an infection.   You no longer wish to have the surgery.   Any other personal circumstances change that may lead to the need to cancel or defer this surgery-such as being sick or getting admitted to any hospital within one week of your planned procedure.    Your contact details change, such as a change of address or phone number.    Starting now:     Please DO NOT drink alcohol or smoke for 24 hours before surgery. It is well known that quitting smoking can make a huge difference to your health and recovery from surgery. The longer you abstain from smoking, the better your chances of a healthy recovery. If you need help with quitting, call  1-800-QUIT-NOW to be connected to a trained counselor who will discuss the best methods to help you quit.     Before your surgery:    Please stop all supplements 7 days prior to surgery. Or as directed by your surgeon.   Please stop taking NSAID pain medicine such as Advil and Motrin 7 days before surgery.    If you develop any fever, cough, cold, rashes, cuts, scratches, scrapes, urinary symptoms or infection anywhere on your body (including teeth and gums) prior to surgery, please call your surgeon’s office as soon as possible. This may require treatment to reduce the chance of cancellation on the day of surgery.    The day before your surgery:   DIET- Please follow the diet instructions at the top of your packet.   Get a good night’s rest.  Use the special soap for bathing if you have been instructed to use one.    Scheduled surgery times may change and you will be notified if this occurs - please check your personal voicemail for any updates.     On the morning of surgery:   Wear comfortable, loose fitting clothes which open in the front. Please do not wear moisturizers, creams, lotions, makeup or perfume.    Please bring with you to surgery:   Photo ID and insurance card   Current list of medicines and allergies   Pacemaker/ Defibrillator/Heart stent cards   CPAP machine and mask    Slings/ splints/ crutches   A copy of your complete advanced directive/DHPOA.    Please do NOT bring with you to surgery:   All jewelry and valuables should be left at home.   Prosthetic devices such as contact lenses, hearing aids, dentures, eyelash extensions, hairpins and body piercings must be removed prior to going in to the surgical suite.    After outpatient surgery:   A responsible adult MUST accompany you at the time of discharge and stay with you for 24 hours after your surgery. You may NOT drive yourself home after surgery.    Do not drive, operate machinery, make critical decisions or do activities that require  co-ordination or balance until after a night’s sleep.   Do not drink alcoholic beverages for 24 hours.   Instructions for resuming your medications will be provided by your surgeon.    CALL YOUR DOCTOR AFTER SURGERY IF YOU HAVE:     Chills and/or a fever of 101° F or higher.    Redness, swelling, pus or drainage from your surgical wound or a bad smell from the wound.    Lightheadedness, fainting or confusion.    Persistent vomiting (throwing up) and are not able to eat or drink for 12 hours.    Three or more loose, watery bowel movements in 24 hours (diarrhea).   Difficulty or pain while urinating( after non-urological surgery)    Pain and swelling in your legs, especially if it is only on one side.    Difficulty breathing or are breathing faster than normal.    Any new concerning symptoms.           Medication List            Accurate as of July 21, 2025 12:22 PM. Always use your most recent med list.                b complex 0.4 mg tablet  Additional Medication Adjustments for Surgery: Take last dose 7 days before surgery     cholecalciferol 125 mcg (5,000 units) capsule  Commonly known as: Vitamin D-3  Additional Medication Adjustments for Surgery: Take last dose 7 days before surgery     magnesium oxide 420 mg tablet  Commonly known as: Mag-Ox  Medication Adjustments for Surgery: Do Not take on the morning of surgery     Naltrex 4.5 mg capsule  Generic drug: naltrexone  Additional Medication Adjustments for Surgery: Take last dose 7 days before surgery     NON FORMULARY  Additional Medication Adjustments for Surgery: Take last dose 7 days before surgery     NON FORMULARY  Additional Medication Adjustments for Surgery: Take last dose 7 days before surgery     OMEGA-3-DHA-EPA-DPA ORAL  Additional Medication Adjustments for Surgery: Take last dose 7 days before surgery     QUERCETIN ORAL  Additional Medication Adjustments for Surgery: Take last dose 7 days before surgery     SELENIUM ORAL  Additional Medication  Adjustments for Surgery: Take last dose 7 days before surgery     ZINC ORAL  Additional Medication Adjustments for Surgery: Take last dose 7 days before surgery                            Preoperative Brain Exercises    What are brain exercises?  A brain exercise is any activity that engages your thinking (cognitive) skills.    What types of activities are considered brain exercises?  Jigsaw puzzles, crossword puzzles, word jumble, memory games, word search, and many more.  Many can be found free online or on your phone via a mobile machelle.    Why should I do brain exercises before my surgery?  More recent research has shown brain exercise before surgery can lower the risk of postoperative delirium (confusion) which can be especially important for older adults.  Patients who did brain exercises for 5 to 10 hours the days before surgery, cut their risk of postoperative delirium in half up to 1 week after surgery.          Patient and Family Education             Ways You Can Help Prevent Blood Clots             This handout explains some simple things you can do to help prevent blood clots.      Blood clots are blockages that can form in the body's veins. When a blood clot forms in your deep veins, it may be called a deep vein thrombosis, or DVT for short. Blood clots can happen in any part of the body where blood flows, but they are most common in the arms and legs. If a piece of a blood clot breaks free and travels to the lungs, it is called a pulmonary embolus (PE). A PE can be a very serious problem.         Being in the hospital or having surgery can raise your chances of getting a blood clot because you may not be well enough to move around as much as you normally do.         Ways you can help prevent blood clots in the hospital         Wearing SCDs. SCDs stands for Sequential Compression Devices.   SCDs are special sleeves that wrap around your legs  They attach to a pump that fills them with air to gently  squeeze your legs every few minutes.   This helps return the blood in your legs to your heart.   SCDs should only be taken off when walking or bathing.   SCDs may not be comfortable, but they can help save your life.               Wearing compression stockings - if your doctor orders them. These special snug fitting stockings gently squeeze your legs to help blood flow.       Walking. Walking helps move the blood in your legs.   If your doctor says it is ok, try walking the halls at least   5 times a day. Ask us to help you get up, so you don't fall.      Taking any blood thinning medicines your doctor orders.        Page 1 of 2     [facility]; 3/23   Ways you can help prevent blood clots at home       Wearing compression stockings - if your doctor orders them. ? Walking - to help move the blood in your legs.       Taking any blood thinning medicines your doctor orders.      Signs of a blood clot or PE      Tell your doctor or nurse know right away if you have of the problems listed below.    If you are at home, seek medical care right away. Call 911 for chest pain or problems breathing.               Signs of a blood clot (DVT) - such as pain,  swelling, redness or warmth in your arm or leg      Signs of a pulmonary embolism (PE) - such as chest     pain or feeling short of breath              [facility]    Preoperative Deep Breathing Exercises    Why it is important to do deep breathing exercises before my surgery?  Deep breathing exercises strengthen your breathing muscles.  This helps you to recover after your surgery and decreases the chance of breathing complications.      How are the deep breathing exercises done?  Sit straight with your back supported.  Breathe in deeply and slowly through your nose. Your lower rib cage should expand and your abdomen may move forward.  Hold that breath for 3 to 5 seconds.  Breathe out through pursed lips, slowly and completely.  Rest and  repeat 10 times every hour while awake.  Rest longer if you become dizzy or lightheaded.      Patient Information: Incentive Spirometer  What is an incentive spirometer?  An incentive spirometer is a device used before and after surgery to “exercise” your lungs.  It helps you to take deeper breaths to expand your lungs.  Below is an example of a basic incentive spirometer.  The device you receive may differ slightly but they all function the same.    Why do I need to use an incentive spirometer?  Using your incentive spirometer prepares your lungs for surgery and helps prevent lung problems after surgery.  How do I use my incentive spirometer?  When you're using your incentive spirometer, make sure to breathe through your mouth. If you breathe through your nose, the incentive spirometer won't work properly. You can hold your nose if you have trouble.  If you feel dizzy at any time, stop and rest. Try again at a later time.  Follow the steps below:  Set up your incentive spirometer, expand the flexible tubing and connect to the outlet.  Sit upright in a chair or bed. Hold the incentive spirometer at eye level.   Put the mouthpiece in your mouth and close your lips tightly around it. Slowly breathe out (exhale) completely.  Breathe in (inhale) slowly through your mouth as deeply as you can. As you take a breath, you will see the piston rise inside the large column. While the piston rises, the indicator should move upwards. It should stay in between the 2 arrows (see Figure).  Try to get the piston as high as you can, while keeping the indicator between the arrows.   If the indicator doesn't stay between the arrows, you're breathing either too fast or too slow.  When you get it as high as you can, hold your breath for 10 seconds, or as long as possible. While you're holding your breath, the piston will slowly fall to the base of the spirometer.  Once the piston reaches the bottom of the spirometer, breathe out slowly  through your mouth. Rest for a few seconds.  Repeat 10 times. Try to get the piston to the same level with each breath.  Repeat every hour while awake  You can carefully clean the outside of the mouthpiece with an alcohol wipe or soap and water.

## 2025-07-28 PROBLEM — K80.20 CALCULUS OF GALLBLADDER: Status: ACTIVE | Noted: 2025-05-07

## 2025-07-28 NOTE — PROGRESS NOTES
"Subjective   Patient ID: Jacklyn Faria \"Pura\" is a 64 y.o. female who presents for discussion of surgical questions.     HPI  Patient returns to the clinic to discuss questions she has regarding her surgery that is scheduled for 08/04/2025.  Patient was last seen in the office on 05/12/2025.    Social History:  Tobacco Use - NO  Do you use any recreational drugs - NO  Alcohol Use - NO  Caffeine Intake - YES  Working - RETIRED  Marital status -   Living with -  SPOUSE    Past Medical History:   Diagnosis Date    Breast mass Feb 2025    Cataract Feb 2024    Beginning signs    Colon polyp Jul 2020    Fractures July 2022    Broken clavicle     Past Surgical History:   Procedure Laterality Date    BLADDER SUSPENSION  Juky 2012    BREAST BIOPSY  Apr 25, 2025    COLONOSCOPY  June 2025    UPPER GASTROINTESTINAL ENDOSCOPY  June 2020    Signs of GERD    US BREAST CORE BIOPSY Left 04/24/2025    VAGINAL PROLAPSE REPAIR  2012     Family History   Problem Relation Name Age of Onset    Kidney disease Father Darius Tanner 60 - 69    Hyperlipidemia Father Darius Tanner 40 - 49    Heart disease Father Darius Tanner 40 - 49    Blood clot Father Darius Tanner     Alcohol abuse Maternal Grandfather Shimon Esqueda     Cancer Maternal Grandmother Sara Hernandez 68    Breast cancer Mother's Sister Deborah Chambersne     Clotting disorder Sister Ashley Byrne     Blood clot Sister Ashley Byrne     Clotting disorder Sister Shania Aguirre     Blood clot Sister Shania Mcnallyko     Blood clot Sister Corinne Abbott     Diabetes Father's Sister Rama Dave 70 - 79    Stroke Father's Sister Rama Dave 70 - 79    Vision loss Father's Sister Rama Dave 70 - 79    Miscarriages / Stillbirths Daughter Bonnie Roman 30 - 39    Cancer Mother's Sister Leticia Danae 59     Allergies   Allergen Reactions    Lidocaine Other     Per pt had racing heart rate after given at dentist    Other Reaction(s): Other      Per pt had racing heart rate after given at dentist " "    Review of Systems  /73 (BP Location: Right arm, Patient Position: Sitting, BP Cuff Size: Adult)   Pulse 68   Temp 36.7 °C (98.1 °F) (Temporal)   Resp 17   Ht 1.651 m (5' 5\")   Wt 76.2 kg (168 lb)   LMP  (LMP Unknown)   SpO2 99%   BMI 27.96 kg/m²     Objective   Physical Exam    Assessment/Plan   Problem List Items Addressed This Visit           ICD-10-CM    Invasive ductal carcinoma of breast, female, left - Primary C50.912    Patient scheduled for Magseed localization lumpectomy sentinel node biopsy left breast for 1.5 cm grade 2 out of 3 ER/RI positive HER2/henry negative tumor. A long discussion was held with the patient today. We together reviewed her labs and pre-operative imaging. We reviewed in detail the step by step events of how her surgical day will proceed and what she can expect that day and post-operatively.  Risks , benefits and alternatives to surgery were thoroughly discussed with the patient who agrees to proceed     PATIENT DOES NOT WANT CLIPS IN EXCISIONAL BED FOR RADIATION PLANNING              Breast History:     Patient is new to clinic and presents for Left US breast biopsy consult, bx on 4/25.  Patient is referred by Dr. Lauren Bhagat.  Patient seen Dr. Bhagat in the office on 3/25/2025 for annual exam. She has identified a palpable mass in her breast, which she suspects might be a cyst. The mass is particularly noticeable when she assumes a lateral position. Additionally, she has detected several smaller masses, the nature of which she is uncertain. She has undergone numerous breast examinations in the past, with no prior mention of an inverted nipple. The breast exam revealed bilateral flat nipples. A mass is present in the lower inner quadrant of the right breast at the 6 o'clock position. Orders were placed for imaging to be completed.  Patient had BI mammogram bilateral diagnostic tomosynthesis and BI US breast limited left completed on 4/16/2025. Impression was suspicious " palpable left breast mass with possible extension to the skin associated with a single prominent internal mammary lymph node without axillary lymphadenopathy. Further evaluation with surgical consultation and ultrasound-guided biopsy is recommended. No mammographic evidence of malignancy in the right breast.   Patient indicates she first felt the lump in February of this year.  She has had no previous breast biopsies.  She does not think the lump is changed since she first noticed it.  No nipple discharge.  Patient with a 1.5 cm mass left breast lower inner quadrant recommending ultrasound-guided core biopsy. Patient to be scheduled for ultrasound guided  biopsy of the breast in the radiology department. Risk, benefits and alternatives to this plan were thoroughly discussed with the patient who agrees to proceed.  The procedure was also thoroughly discussed as well as her follow-up. She is to see me in the office in one week but I also will call as soon as I see the pathology which is usually 4 working days from procedure.   Previous Biopsy: NO Menarche Age: 14/15 Age of first delivery: 19.5 Breastfeed: YES Menopause age: 47 HRT: NO Family history of breast cancer: MATERNAL AUNT Family history of ovarian cancer: NO Family history of pancreatic cancer: NO G 2 P 2   Patient with 1.5 cm invasive ductal carcinoma that is ER/NC positive HER2/henry negative grade 2 out of 3 in the left breast lower medial quadrant. Recommend magseed localization and lumpectomy with sentinal node biopsy verses mastectomy +/- reconstruction with sentinal node biopsy. Approximately 40 minutes spent with patient discussing her tumor, its characteristics and her treatment options. She will ultimately meet with oncology post surgery, and  radiation oncology depending on her surgical treatment choice and its outcome.  Risks benefits and alternatives to surgery were thoroughly discussed with the patient who agrees to proceed.  Patient is agreeable  to proceed with the Magseed localization lumpectomy and sentinel node biopsy   Patient is a candidate for genetic testing     Orin Selby MD 07/29/25 2:30 PM

## 2025-07-29 ENCOUNTER — OFFICE VISIT (OUTPATIENT)
Dept: SURGERY | Facility: CLINIC | Age: 65
End: 2025-07-29
Payer: MEDICARE

## 2025-07-29 VITALS
BODY MASS INDEX: 27.99 KG/M2 | WEIGHT: 168 LBS | DIASTOLIC BLOOD PRESSURE: 73 MMHG | TEMPERATURE: 98.1 F | SYSTOLIC BLOOD PRESSURE: 119 MMHG | OXYGEN SATURATION: 99 % | HEIGHT: 65 IN | HEART RATE: 68 BPM | RESPIRATION RATE: 17 BRPM

## 2025-07-29 DIAGNOSIS — C50.912 INVASIVE DUCTAL CARCINOMA OF BREAST, FEMALE, LEFT: Primary | ICD-10-CM

## 2025-07-29 PROCEDURE — 1036F TOBACCO NON-USER: CPT | Performed by: SURGERY

## 2025-07-29 PROCEDURE — 1126F AMNT PAIN NOTED NONE PRSNT: CPT | Performed by: SURGERY

## 2025-07-29 PROCEDURE — 99214 OFFICE O/P EST MOD 30 MIN: CPT | Performed by: SURGERY

## 2025-07-29 PROCEDURE — 1159F MED LIST DOCD IN RCRD: CPT | Performed by: SURGERY

## 2025-07-29 PROCEDURE — 3008F BODY MASS INDEX DOCD: CPT | Performed by: SURGERY

## 2025-07-29 ASSESSMENT — COLUMBIA-SUICIDE SEVERITY RATING SCALE - C-SSRS
1. IN THE PAST MONTH, HAVE YOU WISHED YOU WERE DEAD OR WISHED YOU COULD GO TO SLEEP AND NOT WAKE UP?: NO
2. HAVE YOU ACTUALLY HAD ANY THOUGHTS OF KILLING YOURSELF?: NO
6. HAVE YOU EVER DONE ANYTHING, STARTED TO DO ANYTHING, OR PREPARED TO DO ANYTHING TO END YOUR LIFE?: NO

## 2025-07-29 ASSESSMENT — LIFESTYLE VARIABLES
HOW OFTEN DO YOU HAVE SIX OR MORE DRINKS ON ONE OCCASION: NEVER
AUDIT-C TOTAL SCORE: 0
HOW MANY STANDARD DRINKS CONTAINING ALCOHOL DO YOU HAVE ON A TYPICAL DAY: PATIENT DOES NOT DRINK
HOW OFTEN DO YOU HAVE A DRINK CONTAINING ALCOHOL: NEVER
SKIP TO QUESTIONS 9-10: 1

## 2025-07-29 ASSESSMENT — PAIN SCALES - GENERAL: PAINLEVEL_OUTOF10: 0-NO PAIN

## 2025-07-29 ASSESSMENT — ENCOUNTER SYMPTOMS
DEPRESSION: 0
OCCASIONAL FEELINGS OF UNSTEADINESS: 0
LOSS OF SENSATION IN FEET: 0

## 2025-07-29 NOTE — ASSESSMENT & PLAN NOTE
Patient scheduled for Magseed localization lumpectomy sentinel node biopsy left breast for 1.5 cm grade 2 out of 3 ER/DE positive HER2/henry negative tumor. A long discussion was held with the patient today. We together reviewed her labs and pre-operative imaging. We reviewed in detail the step by step events of how her surgical day will proceed and what she can expect that day and post-operatively.  Risks , benefits and alternatives to surgery were thoroughly discussed with the patient who agrees to proceed     PATIENT DOES NOT WANT CLIPS IN EXCISIONAL BED FOR RADIATION PLANNING

## 2025-08-03 ENCOUNTER — ANESTHESIA EVENT (OUTPATIENT)
Dept: OPERATING ROOM | Facility: HOSPITAL | Age: 65
End: 2025-08-03
Payer: MEDICARE

## 2025-08-04 ENCOUNTER — HOSPITAL ENCOUNTER (OUTPATIENT)
Dept: RADIOLOGY | Facility: HOSPITAL | Age: 65
Discharge: HOME | End: 2025-08-04
Payer: MEDICARE

## 2025-08-04 ENCOUNTER — PHARMACY VISIT (OUTPATIENT)
Dept: PHARMACY | Facility: CLINIC | Age: 65
End: 2025-08-04
Payer: COMMERCIAL

## 2025-08-04 ENCOUNTER — HOSPITAL ENCOUNTER (OUTPATIENT)
Facility: HOSPITAL | Age: 65
Setting detail: OUTPATIENT SURGERY
Discharge: HOME | End: 2025-08-04
Attending: SURGERY | Admitting: SURGERY
Payer: MEDICARE

## 2025-08-04 ENCOUNTER — APPOINTMENT (OUTPATIENT)
Dept: RADIOLOGY | Facility: HOSPITAL | Age: 65
End: 2025-08-04
Payer: MEDICARE

## 2025-08-04 ENCOUNTER — ANESTHESIA (OUTPATIENT)
Dept: OPERATING ROOM | Facility: HOSPITAL | Age: 65
End: 2025-08-04
Payer: MEDICARE

## 2025-08-04 VITALS
HEART RATE: 56 BPM | BODY MASS INDEX: 27.99 KG/M2 | WEIGHT: 168 LBS | OXYGEN SATURATION: 97 % | TEMPERATURE: 96.8 F | RESPIRATION RATE: 18 BRPM | SYSTOLIC BLOOD PRESSURE: 123 MMHG | DIASTOLIC BLOOD PRESSURE: 62 MMHG | HEIGHT: 65 IN

## 2025-08-04 DIAGNOSIS — C50.912 INVASIVE DUCTAL CARCINOMA OF BREAST, FEMALE, LEFT: Primary | ICD-10-CM

## 2025-08-04 DIAGNOSIS — C50.912 INVASIVE DUCTAL CARCINOMA OF BREAST, FEMALE, LEFT: ICD-10-CM

## 2025-08-04 PROCEDURE — RXMED WILLOW AMBULATORY MEDICATION CHARGE

## 2025-08-04 PROCEDURE — 76098 X-RAY EXAM SURGICAL SPECIMEN: CPT

## 2025-08-04 PROCEDURE — A4649 SURGICAL SUPPLIES: HCPCS | Performed by: SURGERY

## 2025-08-04 PROCEDURE — 3430000001 HC RX 343 DIAGNOSTIC RADIOPHARMACEUTICALS: Performed by: SURGERY

## 2025-08-04 PROCEDURE — 3700000001 HC GENERAL ANESTHESIA TIME - INITIAL BASE CHARGE: Performed by: SURGERY

## 2025-08-04 PROCEDURE — 3600000004 HC OR TIME - INITIAL BASE CHARGE - PROCEDURE LEVEL FOUR: Performed by: SURGERY

## 2025-08-04 PROCEDURE — 3700000002 HC GENERAL ANESTHESIA TIME - EACH INCREMENTAL 1 MINUTE: Performed by: SURGERY

## 2025-08-04 PROCEDURE — 96372 THER/PROPH/DIAG INJ SC/IM: CPT | Performed by: SURGERY

## 2025-08-04 PROCEDURE — 88342 IMHCHEM/IMCYTCHM 1ST ANTB: CPT | Performed by: PATHOLOGY

## 2025-08-04 PROCEDURE — 38900 IO MAP OF SENT LYMPH NODE: CPT | Performed by: SURGERY

## 2025-08-04 PROCEDURE — 2500000004 HC RX 250 GENERAL PHARMACY W/ HCPCS (ALT 636 FOR OP/ED)

## 2025-08-04 PROCEDURE — 3600000009 HC OR TIME - EACH INCREMENTAL 1 MINUTE - PROCEDURE LEVEL FOUR: Performed by: SURGERY

## 2025-08-04 PROCEDURE — 88307 TISSUE EXAM BY PATHOLOGIST: CPT | Performed by: PATHOLOGY

## 2025-08-04 PROCEDURE — 2500000004 HC RX 250 GENERAL PHARMACY W/ HCPCS (ALT 636 FOR OP/ED): Performed by: SURGERY

## 2025-08-04 PROCEDURE — 7100000001 HC RECOVERY ROOM TIME - INITIAL BASE CHARGE: Performed by: SURGERY

## 2025-08-04 PROCEDURE — A9520 TC99 TILMANOCEPT DIAG 0.5MCI: HCPCS | Performed by: SURGERY

## 2025-08-04 PROCEDURE — 2720000007 HC OR 272 NO HCPCS: Performed by: SURGERY

## 2025-08-04 PROCEDURE — 7100000002 HC RECOVERY ROOM TIME - EACH INCREMENTAL 1 MINUTE: Performed by: SURGERY

## 2025-08-04 PROCEDURE — 38525 BIOPSY/REMOVAL LYMPH NODES: CPT | Performed by: SURGERY

## 2025-08-04 PROCEDURE — A38525 PR BX/REMV,LYMPH NODE,DEEP AXILL: Performed by: NURSE ANESTHETIST, CERTIFIED REGISTERED

## 2025-08-04 PROCEDURE — 7100000010 HC PHASE TWO TIME - EACH INCREMENTAL 1 MINUTE: Performed by: SURGERY

## 2025-08-04 PROCEDURE — 7100000009 HC PHASE TWO TIME - INITIAL BASE CHARGE: Performed by: SURGERY

## 2025-08-04 PROCEDURE — 19301 PARTIAL MASTECTOMY: CPT | Performed by: SURGERY

## 2025-08-04 PROCEDURE — A38525 PR BX/REMV,LYMPH NODE,DEEP AXILL: Performed by: ANESTHESIOLOGY

## 2025-08-04 PROCEDURE — 88307 TISSUE EXAM BY PATHOLOGIST: CPT | Mod: TC,59,TRILAB,WESLAB | Performed by: SURGERY

## 2025-08-04 PROCEDURE — 38792 RA TRACER ID OF SENTINL NODE: CPT

## 2025-08-04 RX ORDER — SODIUM CHLORIDE, SODIUM LACTATE, POTASSIUM CHLORIDE, CALCIUM CHLORIDE 600; 310; 30; 20 MG/100ML; MG/100ML; MG/100ML; MG/100ML
INJECTION, SOLUTION INTRAVENOUS CONTINUOUS PRN
Status: DISCONTINUED | OUTPATIENT
Start: 2025-08-04 | End: 2025-08-04

## 2025-08-04 RX ORDER — LIDOCAINE HYDROCHLORIDE 20 MG/ML
INJECTION, SOLUTION INFILTRATION; PERINEURAL AS NEEDED
Status: DISCONTINUED | OUTPATIENT
Start: 2025-08-04 | End: 2025-08-04

## 2025-08-04 RX ORDER — MIDAZOLAM HYDROCHLORIDE 1 MG/ML
INJECTION, SOLUTION INTRAMUSCULAR; INTRAVENOUS AS NEEDED
Status: DISCONTINUED | OUTPATIENT
Start: 2025-08-04 | End: 2025-08-04

## 2025-08-04 RX ORDER — GLYCOPYRROLATE 0.2 MG/ML
INJECTION INTRAMUSCULAR; INTRAVENOUS AS NEEDED
Status: DISCONTINUED | OUTPATIENT
Start: 2025-08-04 | End: 2025-08-04

## 2025-08-04 RX ORDER — LIDOCAINE HYDROCHLORIDE 10 MG/ML
0.1 INJECTION, SOLUTION INFILTRATION; PERINEURAL ONCE
Status: DISCONTINUED | OUTPATIENT
Start: 2025-08-04 | End: 2025-08-04

## 2025-08-04 RX ORDER — HYDROCODONE BITARTRATE AND ACETAMINOPHEN 5; 325 MG/1; MG/1
1 TABLET ORAL EVERY 6 HOURS PRN
Qty: 20 TABLET | Refills: 0 | Status: SHIPPED | OUTPATIENT
Start: 2025-08-04

## 2025-08-04 RX ORDER — HYDRALAZINE HYDROCHLORIDE 20 MG/ML
5 INJECTION INTRAMUSCULAR; INTRAVENOUS EVERY 30 MIN PRN
Status: DISCONTINUED | OUTPATIENT
Start: 2025-08-04 | End: 2025-08-04 | Stop reason: HOSPADM

## 2025-08-04 RX ORDER — CEFAZOLIN 1 G/1
INJECTION, POWDER, FOR SOLUTION INTRAVENOUS AS NEEDED
Status: DISCONTINUED | OUTPATIENT
Start: 2025-08-04 | End: 2025-08-04

## 2025-08-04 RX ORDER — CEFAZOLIN SODIUM 2 G/100ML
2 INJECTION, SOLUTION INTRAVENOUS ONCE
Status: DISCONTINUED | OUTPATIENT
Start: 2025-08-04 | End: 2025-08-04 | Stop reason: HOSPADM

## 2025-08-04 RX ORDER — ONDANSETRON HYDROCHLORIDE 2 MG/ML
INJECTION, SOLUTION INTRAVENOUS AS NEEDED
Status: DISCONTINUED | OUTPATIENT
Start: 2025-08-04 | End: 2025-08-04

## 2025-08-04 RX ORDER — FENTANYL CITRATE 50 UG/ML
INJECTION, SOLUTION INTRAMUSCULAR; INTRAVENOUS AS NEEDED
Status: DISCONTINUED | OUTPATIENT
Start: 2025-08-04 | End: 2025-08-04

## 2025-08-04 RX ORDER — HYDROMORPHONE HYDROCHLORIDE 0.2 MG/ML
0.2 INJECTION INTRAMUSCULAR; INTRAVENOUS; SUBCUTANEOUS EVERY 5 MIN PRN
Status: DISCONTINUED | OUTPATIENT
Start: 2025-08-04 | End: 2025-08-04 | Stop reason: HOSPADM

## 2025-08-04 RX ORDER — PHENYLEPHRINE HCL IN 0.9% NACL 1 MG/10 ML
SYRINGE (ML) INTRAVENOUS AS NEEDED
Status: DISCONTINUED | OUTPATIENT
Start: 2025-08-04 | End: 2025-08-04

## 2025-08-04 RX ORDER — ALBUTEROL SULFATE 0.83 MG/ML
2.5 SOLUTION RESPIRATORY (INHALATION) ONCE AS NEEDED
Status: DISCONTINUED | OUTPATIENT
Start: 2025-08-04 | End: 2025-08-04 | Stop reason: HOSPADM

## 2025-08-04 RX ORDER — PROPOFOL 10 MG/ML
INJECTION, EMULSION INTRAVENOUS AS NEEDED
Status: DISCONTINUED | OUTPATIENT
Start: 2025-08-04 | End: 2025-08-04

## 2025-08-04 RX ORDER — BUPIVACAINE HYDROCHLORIDE 5 MG/ML
INJECTION, SOLUTION PERINEURAL AS NEEDED
Status: DISCONTINUED | OUTPATIENT
Start: 2025-08-04 | End: 2025-08-04 | Stop reason: HOSPADM

## 2025-08-04 RX ORDER — ONDANSETRON HYDROCHLORIDE 2 MG/ML
4 INJECTION, SOLUTION INTRAVENOUS ONCE AS NEEDED
Status: DISCONTINUED | OUTPATIENT
Start: 2025-08-04 | End: 2025-08-04 | Stop reason: HOSPADM

## 2025-08-04 RX ORDER — HYDROMORPHONE HYDROCHLORIDE 0.2 MG/ML
0.1 INJECTION INTRAMUSCULAR; INTRAVENOUS; SUBCUTANEOUS EVERY 5 MIN PRN
Status: DISCONTINUED | OUTPATIENT
Start: 2025-08-04 | End: 2025-08-04 | Stop reason: HOSPADM

## 2025-08-04 RX ORDER — SODIUM CHLORIDE, SODIUM LACTATE, POTASSIUM CHLORIDE, CALCIUM CHLORIDE 600; 310; 30; 20 MG/100ML; MG/100ML; MG/100ML; MG/100ML
100 INJECTION, SOLUTION INTRAVENOUS CONTINUOUS
Status: DISCONTINUED | OUTPATIENT
Start: 2025-08-04 | End: 2025-08-04 | Stop reason: HOSPADM

## 2025-08-04 RX ORDER — LIDOCAINE HYDROCHLORIDE AND EPINEPHRINE 10; 10 UG/ML; MG/ML
INJECTION, SOLUTION INFILTRATION; PERINEURAL AS NEEDED
Status: DISCONTINUED | OUTPATIENT
Start: 2025-08-04 | End: 2025-08-04 | Stop reason: HOSPADM

## 2025-08-04 RX ADMIN — FENTANYL CITRATE 50 MCG: 0.05 INJECTION, SOLUTION INTRAMUSCULAR; INTRAVENOUS at 08:27

## 2025-08-04 RX ADMIN — DEXAMETHASONE SODIUM PHOSPHATE 4 MG: 4 INJECTION, SOLUTION INTRAMUSCULAR; INTRAVENOUS at 08:28

## 2025-08-04 RX ADMIN — GLYCOPYRROLATE 0.2 MG: 0.2 INJECTION, SOLUTION INTRAMUSCULAR; INTRAVENOUS at 08:41

## 2025-08-04 RX ADMIN — Medication 100 MCG: at 08:34

## 2025-08-04 RX ADMIN — PROPOFOL 160 MG: 10 INJECTION, EMULSION INTRAVENOUS at 08:22

## 2025-08-04 RX ADMIN — ONDANSETRON 4 MG: 2 INJECTION, SOLUTION INTRAMUSCULAR; INTRAVENOUS at 08:28

## 2025-08-04 RX ADMIN — MIDAZOLAM 2 MG: 1 INJECTION INTRAMUSCULAR; INTRAVENOUS at 08:14

## 2025-08-04 RX ADMIN — Medication 100 MCG: at 08:42

## 2025-08-04 RX ADMIN — Medication 0.5 MILLICURIE: at 08:30

## 2025-08-04 RX ADMIN — CEFAZOLIN 2 G: 330 INJECTION, POWDER, FOR SOLUTION INTRAMUSCULAR; INTRAVENOUS at 08:26

## 2025-08-04 RX ADMIN — Medication 100 MCG: at 08:51

## 2025-08-04 RX ADMIN — SODIUM CHLORIDE, POTASSIUM CHLORIDE, SODIUM LACTATE AND CALCIUM CHLORIDE: 600; 310; 30; 20 INJECTION, SOLUTION INTRAVENOUS at 08:17

## 2025-08-04 RX ADMIN — LIDOCAINE HYDROCHLORIDE 50 MG: 20 INJECTION, SOLUTION INFILTRATION; PERINEURAL at 08:22

## 2025-08-04 SDOH — HEALTH STABILITY: MENTAL HEALTH: CURRENT SMOKER: 0

## 2025-08-04 ASSESSMENT — PAIN - FUNCTIONAL ASSESSMENT
PAIN_FUNCTIONAL_ASSESSMENT: 0-10

## 2025-08-04 ASSESSMENT — PAIN SCALES - GENERAL
PAINLEVEL_OUTOF10: 0 - NO PAIN
PAINLEVEL_OUTOF10: 3
PAINLEVEL_OUTOF10: 4
PAINLEVEL_OUTOF10: 0 - NO PAIN
PAINLEVEL_OUTOF10: 4
PAINLEVEL_OUTOF10: 4

## 2025-08-04 ASSESSMENT — PAIN DESCRIPTION - DESCRIPTORS
DESCRIPTORS: ACHING
DESCRIPTORS: ACHING

## 2025-08-04 NOTE — ANESTHESIA POSTPROCEDURE EVALUATION
"Patient: Jacklyn Faria \"Pura\"    Procedure Summary       Date: 08/04/25 Room / Location: [facility] / [facility]    Anesthesia Start: 0817 Anesthesia Stop: 0953    Procedures:       Magseed Localization Lumpectomy (Left)      [place] Node Biopsy (Left) Diagnosis:       Invasive ductal carcinoma of breast, female, left      (Invasive ductal carcinoma of breast, female, left [C50.912])    Surgeons: Orin Selby MD Responsible Provider: Parish Reeves MD    Anesthesia Type: general ASA Status: 1            Anesthesia Type: general    Vitals Value Taken Time   /61 08/04/25 11:00   Temp 36 °C (96.8 °F) 08/04/25 09:54   Pulse 60 08/04/25 11:00   Resp 17 08/04/25 11:00   SpO2 98 % 08/04/25 11:00       Anesthesia Post Evaluation    Patient location during evaluation: PACU  Patient participation: complete - patient participated  Level of consciousness: awake and alert  Pain management: adequate  Airway patency: patent  Cardiovascular status: acceptable  Respiratory status: acceptable  Hydration status: acceptable  Postoperative Nausea and Vomiting: none        There were no known notable events for this encounter.    "

## 2025-08-04 NOTE — ANESTHESIA PROCEDURE NOTES
Airway  Date/Time: 8/4/2025 8:24 AM  Reason: elective    Airway not difficult    Staffing  Performed: HUGO   Authorized by: Parish Reeves MD    Performed by: Cresencio Lopez  Patient location during procedure: OR    Patient Condition  Indications for airway management: anesthesia  Patient position: sniffing  Planned trial extubation  Sedation level: deep     Final Airway Details   Preoxygenated: yes  Final airway type: supraglottic airway  Successful airway: classic  Size: 3  Number of attempts at approach: 1  Number of other approaches attempted: 0

## 2025-08-04 NOTE — ANESTHESIA PREPROCEDURE EVALUATION
"Patient: Jacklyn Faria \"Pura\"    Procedure Information       Date/Time: 08/04/25 0800    Procedures:       Magseed Localization Lumpectomy (Left)      [place] Node Biopsy (Left) - N.M. Injection    Location: [facility] / [facility]    Surgeons: Orin Selby MD        Medical History[1]     Relevant Problems   Liver   (+) Calculus of gallbladder      GYN   (+) Invasive ductal carcinoma of breast, female, left     Surgical History[2]   Clinical information reviewed:   Tobacco  Allergies  Meds   Med Hx  Surg Hx  OB Status  Fam Hx  Soc   Hx        NPO Detail:  NPO/Void Status  Carbohydrate Drink Given Prior to Surgery? : N  Date of Last Liquid: 08/03/25  Time of Last Liquid: 2200  Date of Last Solid: 08/02/25  Time of Last Solid: 1300  Last Intake Type: Clear fluids  Time of Last Void: 0645         Physical Exam    Airway  Mallampati: II  TM distance: >3 FB  Neck ROM: full     Cardiovascular    Dental    Pulmonary    Abdominal            Anesthesia Plan    History of general anesthesia?: yes  History of complications of general anesthesia?: no    ASA 1     general     The patient is not a current smoker.  Patient was not previously instructed to abstain from smoking on day of procedure.  Patient did not smoke on day of procedure.    intravenous induction   Postoperative pain plan includes opioids.  Anesthetic plan and risks discussed with patient.    Plan discussed with attending.           [1]   Past Medical History:  Diagnosis Date    Breast mass Feb 2025    Cataract Feb 2024    Beginning signs    Colon polyp Jul 2020    Fractures July 2022    Broken clavicle   [2]   Past Surgical History:  Procedure Laterality Date    BLADDER SUSPENSION  [place] 2012    BREAST BIOPSY  Apr 25, 2025    COLONOSCOPY  June 2025    UPPER GASTROINTESTINAL ENDOSCOPY  June 2020    Signs of GERD    US BREAST CORE BIOPSY Left 04/24/2025    VAGINAL PROLAPSE REPAIR  2012     "

## 2025-08-04 NOTE — POST-PROCEDURE NOTE
1100- pt brought back from pacu,verbal report received. Pt is alert and awake.  Rx to go called. Pt having some ice chips. Family being brought back from waiting room.    1119- rx to go at bedside.    1130- vss. Dc instructions given and explained to pt and family. All verbally understood. Pt states she would like to rest a little longer. Pt is tolerating ice chips.     1220- pt getting dressed at this time with assistance of .    1226- pt ambulated to BR with steady gait.    1231- volunteer at bedside with wheelchair for dc.

## 2025-08-04 NOTE — OP NOTE
"Magseed Localization Lumpectomy (L), [place] Node Biopsy (L) Operative Note     Date: 2025  OR Location: [facility]    Name: Jacklyn Faria \"Pura\", : 1960, Age: 64 y.o., MRN: [id number], Sex: female    Diagnosis  Pre-op Diagnosis      * Invasive ductal carcinoma of breast, female, left [C50.912] Post-op Diagnosis     * Invasive ductal carcinoma of breast, female, left [C50.912]     Procedures  Magseed Localization Lumpectomy  83503 - DC MASTECTOMY PARTIAL    [place] Node Biopsy  57619 - DC BX/EXC LYMPH NODE OPEN DEEP AXILLARY NODE    DC INTRAOP SENTINEL LYMPH NODE ID W/DYE INJECTION [[id number]]  Surgeons      * Orin Selby - Primary    Resident/Fellow/Other Assistant:  Surgeons and Role:  * No surgeons found with a matching role *    Staff:   Circulator: Omar Capps Person: Chely    Anesthesia Staff: Anesthesiologist: Parish Reeves MD  CRNA: ADAN Fernandez-BRISA  SRNA: Cresencio Lopez    Procedure Summary  Anesthesia: General  ASA: I  Estimated Blood Loss: 2mL  Intra-op Medications:   Administrations occurring from 0800 to 1000 on 25:   Medication Name Total Dose   BUPivacaine HCl (Marcaine) 0.5 % (5 mg/mL) injection 5 mL   lidocaine-epinephrine (Xylocaine W/EPI) 1 %-1:100,000 injection 5 mL   ceFAZolin (Ancef) vial 1 g 2 g   dexAMETHasone (Decadron) 4 mg/mL IV Syringe 2 mL 4 mg   fentaNYL (Sublimaze) injection 50 mcg/mL 50 mcg   glycopyrrolate (Robinul) injection 0.2 mg   LR infusion Cannot be calculated   lidocaine (Xylocaine) 2 % 50 mg   midazolam (Versed) injection 1 mg/mL 2 mg   ondansetron (Zofran) 2 mg/mL injection 4 mg   phenylephrine 100 mcg/mL syringe 10 mL (prefilled) 300 mcg   propofol (Diprivan) injection 10 mg/mL 160 mg              Anesthesia Record               Intraprocedure I/O Totals       None           Specimen:   ID Type Source Tests Collected by Time   1 : left breast sentinal lymph node Tissue SENTINEL LYMPH NODE BREAST LEFT SURGICAL PATHOLOGY EXAM Orin TRISTAN" "MD [place] 8/4/2025 0902   2 : left breast lumpectomy with magseed; short=superior, long=medial Tissue BREAST LUMPECTOMY LEFT SURGICAL PATHOLOGY EXAM Orin Selby MD 8/4/2025 0905                 Drains and/or Catheters: * None in log *    Tourniquet Times:         Implants:     Findings: Radiographic confirmation of excision of the affected area.  2 sentinel nodes excised both with counts over 5000 and Lymphazurin    Indications: Jacklyn Faria \"Pura\" is an 64 y.o. female who is having surgery for Invasive ductal carcinoma of breast, female, left [C50.912].     The patient was seen in the preoperative area. The risks, benefits, complications, treatment options, non-operative alternatives, expected recovery and outcomes were discussed with the patient. The possibilities of reaction to medication, pulmonary aspiration, injury to surrounding structures, bleeding, recurrent infection, the need for additional procedures, failure to diagnose a condition, and creating a complication requiring transfusion or operation were discussed with the patient. The patient concurred with the proposed plan, giving informed consent.  The site of surgery was properly noted/marked if necessary per policy. The patient has been actively warmed in preoperative area. Preoperative antibiotics have been ordered and given within 1 hours of incision. Venous thrombosis prophylaxis have been ordered including bilateral sequential compression devices    Procedure Details: Patient was brought to Room in the supine position after undergoing Magseed injection into the left breast prior to her surgery date..   General anesthesia was obtained with LMA. Breast was prepped and draped in a sterile fashion.     Technetium was injected into the left breast at the nipple areolar epithelial junction at the 3:00 and 9 o'clock position.  This was in the dermis.  In addition Lymphazurin was injected into the breast in the upper outer quadrant at the " nipple areolar epithelial junction.  Massaging was done to the breast.  Left breast and axilla was then prepped and draped in a sterile fashion.    Operation began with sentinel node biopsy. The sentinel node probe was placed into the axilla and where there were high counts marking pen was used to delineate the line of incision. 1% lidocaine mixed with 25% Marcaine with epi was infiltrated into the dermis. A 15 scalpel was used to make a 2-1/2 centimeter incision in the skin. Underlying subcutaneous tissue  using electrocautery. Axillary fascia was opened with the Harmonic thunderbeat scalpel. Probe was placed into the axillary fatty tissue and where there were high counts the [place] and right angle was used to grasp the tissue and dissect the lymph node from the surrounding tissue.  This lymph node also had Lymphazurin.  After the lymph node was removed it was placed on the probe outside the field and had counts of over 5000. The probe was placed back into the axilla and there was a 2nd area of high counts.  This was dissected using the Thunder Beat and a 2nd sentinel node was found.  The second node also had Lymphazurin and high counts.  The probe was placed back into the axilla and there were no other high counts. The axilla was irrigated with water. The axillary fascia was closed with a 2-0 Vicryl stitch. The dermis was closed with interrupted 3-0 Vicryl followed by a running 4-0 Monocryl for the skin.  Operation then continued with lumpectomy. Marking pen was used to delineate the line of incision in the lower inner quadrant of the left  breast where the magseed probe had high counts. Local was infiltrated in the tissue. Fifteen blade scalpel was used to make a 4 centimeter incision in the skin.  The tissue around the Magseed was excised using electrocautery.  The fascia was taken deep to the specimen.  The specimen was constantly checked during the dissection to ensure the Magseed was centrally  located.  After it was excised it was marked appropriately with a short stitch superior and long stitch medial .  It was sent to the Radiology Department who confirmed the presence of the affected area. The lumpectomy cavity was then irrigated with water. Hemostasis maintained with electrocautery.   Incision was then closed using interrupted 3-0 Vicryl followed by a running 4-0 Monocryl. Steri-Strips and sterile dressings were applied patient tolerated procedure well LMA was removed in the operating room and she was transferred to recovery room with rails up all counts were good    Evidence of Infection: No   Complications:  None; patient tolerated the procedure well.    Disposition: PACU - hemodynamically stable.  Condition: stable     [place] Node Biopsy for Breast Cancer - Left  Operation performed with curative intent Yes   Tracer(s) used to identify sentinel nodes in the upfront surgery (non-neoadjuvant) setting Dye and Radioactive tracer   Tracer(s) used to identify sentinel nodes in the neoadjuvant setting N/A   All nodes (colored or non-colored) present at the end of a dye-filled lymphatic channel were removed Yes   All significantly radioactive nodes were removed Yes   All palpably suspicious nodes were removed N/A   Biopsy-proven positive nodes marked with clips prior to chemotherapy were identified and removed N/A             Task Performed by KADEEM First Assist or Physician Assistant:   Dulce BURTON/NP, was necessary to assist on this case due to the nature of the case and difficulty. During the case she served as my assist by achieving adequate exposure      Additional Details:     Attending Attestation: I was present and scrubbed for the entire procedure.    Orin Selby  Phone Number: [phone]

## 2025-08-18 ENCOUNTER — OFFICE VISIT (OUTPATIENT)
Dept: SURGERY | Facility: CLINIC | Age: 65
End: 2025-08-18
Payer: MEDICARE

## 2025-08-18 VITALS
WEIGHT: 168 LBS | DIASTOLIC BLOOD PRESSURE: 66 MMHG | OXYGEN SATURATION: 99 % | SYSTOLIC BLOOD PRESSURE: 113 MMHG | HEART RATE: 64 BPM | TEMPERATURE: 97.5 F | BODY MASS INDEX: 27.99 KG/M2 | HEIGHT: 65 IN

## 2025-08-18 DIAGNOSIS — N63.24 MASS OF LOWER INNER QUADRANT OF LEFT BREAST: Primary | ICD-10-CM

## 2025-08-18 DIAGNOSIS — C50.912 CARCINOMA OF BREAST METASTATIC TO AXILLARY LYMPH NODE, LEFT: ICD-10-CM

## 2025-08-18 DIAGNOSIS — C50.912 INVASIVE DUCTAL CARCINOMA OF BREAST, FEMALE, LEFT: Primary | ICD-10-CM

## 2025-08-18 DIAGNOSIS — C77.3 CARCINOMA OF BREAST METASTATIC TO AXILLARY LYMPH NODE, LEFT: ICD-10-CM

## 2025-08-18 DIAGNOSIS — C50.912 CARCINOMA OF LEFT BREAST METASTATIC TO AXILLARY LYMPH NODE: ICD-10-CM

## 2025-08-18 DIAGNOSIS — Z80.3 FAMILY HISTORY OF MALIGNANT NEOPLASM OF BREAST: ICD-10-CM

## 2025-08-18 DIAGNOSIS — C77.3 CARCINOMA OF LEFT BREAST METASTATIC TO AXILLARY LYMPH NODE: ICD-10-CM

## 2025-08-18 PROCEDURE — 99211 OFF/OP EST MAY X REQ PHY/QHP: CPT | Performed by: SURGERY

## 2025-08-18 PROCEDURE — 1126F AMNT PAIN NOTED NONE PRSNT: CPT | Performed by: SURGERY

## 2025-08-18 PROCEDURE — 3008F BODY MASS INDEX DOCD: CPT | Performed by: SURGERY

## 2025-08-18 PROCEDURE — 1159F MED LIST DOCD IN RCRD: CPT | Performed by: SURGERY

## 2025-08-18 PROCEDURE — 1036F TOBACCO NON-USER: CPT | Performed by: SURGERY

## 2025-08-18 RX ORDER — CEFAZOLIN SODIUM 2 G/100ML
2 INJECTION, SOLUTION INTRAVENOUS ONCE
OUTPATIENT
Start: 2025-08-18 | End: 2025-08-18

## 2025-08-18 ASSESSMENT — PATIENT HEALTH QUESTIONNAIRE - PHQ9
SUM OF ALL RESPONSES TO PHQ QUESTIONS 1-9: 4
8. MOVING OR SPEAKING SO SLOWLY THAT OTHER PEOPLE COULD HAVE NOTICED. OR THE OPPOSITE, BEING SO FIGETY OR RESTLESS THAT YOU HAVE BEEN MOVING AROUND A LOT MORE THAN USUAL: NOT AT ALL
1. LITTLE INTEREST OR PLEASURE IN DOING THINGS: SEVERAL DAYS
7. TROUBLE CONCENTRATING ON THINGS, SUCH AS READING THE NEWSPAPER OR WATCHING TELEVISION: SEVERAL DAYS
9. THOUGHTS THAT YOU WOULD BE BETTER OFF DEAD, OR OF HURTING YOURSELF: NOT AT ALL
2. FEELING DOWN, DEPRESSED OR HOPELESS: SEVERAL DAYS
3. TROUBLE FALLING OR STAYING ASLEEP: SEVERAL DAYS
6. FEELING BAD ABOUT YOURSELF - OR THAT YOU ARE A FAILURE OR HAVE LET YOURSELF OR YOUR FAMILY DOWN: NOT AT ALL
4. FEELING TIRED OR HAVING LITTLE ENERGY: NOT AT ALL
SUM OF ALL RESPONSES TO PHQ9 QUESTIONS 1 & 2: 2
5. POOR APPETITE OR OVEREATING: NOT AT ALL

## 2025-08-18 ASSESSMENT — ENCOUNTER SYMPTOMS
DEPRESSION: 0
OCCASIONAL FEELINGS OF UNSTEADINESS: 0
LOSS OF SENSATION IN FEET: 0

## 2025-08-18 ASSESSMENT — COLUMBIA-SUICIDE SEVERITY RATING SCALE - C-SSRS
2. HAVE YOU ACTUALLY HAD ANY THOUGHTS OF KILLING YOURSELF?: NO
6. HAVE YOU EVER DONE ANYTHING, STARTED TO DO ANYTHING, OR PREPARED TO DO ANYTHING TO END YOUR LIFE?: NO
1. IN THE PAST MONTH, HAVE YOU WISHED YOU WERE DEAD OR WISHED YOU COULD GO TO SLEEP AND NOT WAKE UP?: NO

## 2025-08-18 ASSESSMENT — PAIN SCALES - GENERAL: PAINLEVEL_OUTOF10: 0-NO PAIN

## 2025-09-02 ENCOUNTER — PATIENT OUTREACH (OUTPATIENT)
Dept: HEMATOLOGY/ONCOLOGY | Facility: CLINIC | Age: 65
End: 2025-09-02
Payer: MEDICARE

## 2025-09-02 LAB — SCAN RESULT: NORMAL

## 2025-09-02 SDOH — ECONOMIC STABILITY: GENERAL
WHICH OF THE FOLLOWING DO YOU KNOW HOW TO USE AND HAVE ACCESS TO EVERY DAY? (CHOOSE ALL THAT APPLY): SMARTPHONE WITH CELLULAR DATA PLAN;DESKTOP COMPUTER, LAPTOP COMPUTER, OR TABLET WITH BROADBAND INTERNET CONNECTION

## 2025-09-02 SDOH — ECONOMIC STABILITY: TRANSPORTATION INSECURITY
IN THE PAST 12 MONTHS, HAS THE LACK OF TRANSPORTATION KEPT YOU FROM MEDICAL APPOINTMENTS OR FROM GETTING MEDICATIONS?: NO

## 2025-09-02 SDOH — ECONOMIC STABILITY: TRANSPORTATION INSECURITY
IN THE PAST 12 MONTHS, HAS LACK OF TRANSPORTATION KEPT YOU FROM MEETINGS, WORK, OR FROM GETTING THINGS NEEDED FOR DAILY LIVING?: NO

## 2025-09-02 ASSESSMENT — SOCIAL DETERMINANTS OF HEALTH (SDOH)
WITHIN THE LAST YEAR, HAVE YOU BEEN AFRAID OF YOUR PARTNER OR EX-PARTNER?: NO
IN THE PAST 12 MONTHS, HAS THE ELECTRIC, GAS, OIL, OR WATER COMPANY THREATENED TO SHUT OFF SERVICE IN YOUR HOME?: NO
HOW HARD IS IT FOR YOU TO PAY FOR THE VERY BASICS LIKE FOOD, HOUSING, MEDICAL CARE, AND HEATING?: NOT HARD AT ALL
IN A TYPICAL WEEK, HOW MANY TIMES DO YOU TALK ON THE PHONE WITH FAMILY, FRIENDS, OR NEIGHBORS?: MORE THAN THREE TIMES A WEEK
WITHIN THE LAST YEAR, HAVE YOU BEEN HUMILIATED OR EMOTIONALLY ABUSED IN OTHER WAYS BY YOUR PARTNER OR EX-PARTNER?: NO
WITHIN THE LAST YEAR, HAVE TO BEEN RAPED OR FORCED TO HAVE ANY KIND OF SEXUAL ACTIVITY BY YOUR PARTNER OR EX-PARTNER?: NO
WITHIN THE LAST YEAR, HAVE YOU BEEN KICKED, HIT, SLAPPED, OR OTHERWISE PHYSICALLY HURT BY YOUR PARTNER OR EX-PARTNER?: NO
HOW OFTEN DO YOU ATTEND CHURCH OR RELIGIOUS SERVICES?: MORE THAN 4 TIMES PER YEAR
HOW OFTEN DO YOU GET TOGETHER WITH FRIENDS OR RELATIVES?: MORE THAN THREE TIMES A WEEK
DO YOU BELONG TO ANY CLUBS OR ORGANIZATIONS SUCH AS CHURCH GROUPS UNIONS, FRATERNAL OR ATHLETIC GROUPS, OR SCHOOL GROUPS?: YES
HOW OFTEN DO YOU ATTENT MEETINGS OF THE CLUB OR ORGANIZATION YOU BELONG TO?: MORE THAN 4 TIMES PER YEAR

## 2025-09-02 ASSESSMENT — LIFESTYLE VARIABLES
HOW OFTEN DO YOU HAVE A DRINK CONTAINING ALCOHOL: MONTHLY OR LESS
SKIP TO QUESTIONS 9-10: 1
HOW OFTEN DO YOU HAVE SIX OR MORE DRINKS ON ONE OCCASION: NEVER
AUDIT-C TOTAL SCORE: 1
HOW MANY STANDARD DRINKS CONTAINING ALCOHOL DO YOU HAVE ON A TYPICAL DAY: PATIENT DOES NOT DRINK

## 2025-09-02 ASSESSMENT — PATIENT HEALTH QUESTIONNAIRE - PHQ9
1. LITTLE INTEREST OR PLEASURE IN DOING THINGS: NOT AT ALL
SUM OF ALL RESPONSES TO PHQ9 QUESTIONS 1 & 2: 0
2. FEELING DOWN, DEPRESSED OR HOPELESS: NOT AT ALL

## 2025-09-03 ENCOUNTER — HOSPITAL ENCOUNTER (OUTPATIENT)
Dept: RADIOLOGY | Facility: CLINIC | Age: 65
Discharge: HOME | End: 2025-09-03
Payer: MEDICARE

## 2025-09-03 ENCOUNTER — APPOINTMENT (OUTPATIENT)
Dept: PREADMISSION TESTING | Facility: HOSPITAL | Age: 65
End: 2025-09-03
Payer: MEDICARE

## 2025-09-03 DIAGNOSIS — C77.3 CARCINOMA OF BREAST METASTATIC TO AXILLARY LYMPH NODE, LEFT: ICD-10-CM

## 2025-09-03 DIAGNOSIS — C50.912 CARCINOMA OF BREAST METASTATIC TO AXILLARY LYMPH NODE, LEFT: ICD-10-CM

## 2025-09-03 PROCEDURE — A9552 F18 FDG: HCPCS | Performed by: SURGERY

## 2025-09-03 PROCEDURE — 3430000001 HC RX 343 DIAGNOSTIC RADIOPHARMACEUTICALS: Performed by: SURGERY

## 2025-09-03 PROCEDURE — 78816 PET IMAGE W/CT FULL BODY: CPT

## 2025-09-03 RX ORDER — FLUDEOXYGLUCOSE F 18 200 MCI/ML
12.1 INJECTION, SOLUTION INTRAVENOUS
Status: COMPLETED | OUTPATIENT
Start: 2025-09-03 | End: 2025-09-03

## 2025-09-03 RX ADMIN — FLUDEOXYGLUCOSE F 18 12.1 MILLICURIE: 200 INJECTION, SOLUTION INTRAVENOUS at 07:19

## 2025-09-04 ENCOUNTER — PRE-ADMISSION TESTING (OUTPATIENT)
Dept: PREADMISSION TESTING | Facility: HOSPITAL | Age: 65
End: 2025-09-04
Payer: MEDICARE

## 2025-09-04 VITALS
BODY MASS INDEX: 27.32 KG/M2 | DIASTOLIC BLOOD PRESSURE: 52 MMHG | SYSTOLIC BLOOD PRESSURE: 113 MMHG | HEART RATE: 54 BPM | OXYGEN SATURATION: 98 % | HEIGHT: 65 IN | TEMPERATURE: 97.5 F | RESPIRATION RATE: 16 BRPM | WEIGHT: 164 LBS

## 2025-09-04 DIAGNOSIS — C50.912 INVASIVE DUCTAL CARCINOMA OF BREAST, FEMALE, LEFT: ICD-10-CM

## 2025-09-04 DIAGNOSIS — Z01.818 PRE-OP TESTING: Primary | ICD-10-CM

## 2025-09-04 PROCEDURE — 99204 OFFICE O/P NEW MOD 45 MIN: CPT | Performed by: PHYSICIAN ASSISTANT

## 2025-09-04 RX ORDER — SYRING-NEEDL,DISP,INSUL,0.3 ML 29 G X1/2"
296 SYRINGE, EMPTY DISPOSABLE MISCELLANEOUS ONCE
COMMUNITY

## 2025-09-04 ASSESSMENT — DUKE ACTIVITY SCORE INDEX (DASI)
CAN YOU WALK A BLOCK OR TWO ON LEVEL GROUND: YES
CAN YOU PARTICIPATE IN STRENOUS SPORTS LIKE SWIMMING, SINGLES TENNIS, FOOTBALL, BASKETBALL, OR SKIING: YES
CAN YOU DO HEAVY WORK AROUND THE HOUSE LIKE SCRUBBING FLOORS OR LIFTING AND MOVING HEAVY FURNITURE: YES
CAN YOU WALK INDOORS, SUCH AS AROUND YOUR HOUSE: YES
CAN YOU DO YARD WORK LIKE RAKING LEAVES, WEEDING OR PUSHING A MOWER: YES
TOTAL_SCORE: 58.2
DASI METS SCORE: 9.9
CAN YOU DO LIGHT WORK AROUND THE HOUSE LIKE DUSTING OR WASHING DISHES: YES
CAN YOU DO MODERATE WORK AROUND THE HOUSE LIKE VACUUMING, SWEEPING FLOORS OR CARRYING GROCERIES: YES
CAN YOU RUN A SHORT DISTANCE: YES
CAN YOU CLIMB A FLIGHT OF STAIRS OR WALK UP A HILL: YES
CAN YOU HAVE SEXUAL RELATIONS: YES
CAN YOU PARTICIPATE IN MODERATE RECREATIONAL ACTIVITIES LIKE GOLF, BOWLING, DANCING, DOUBLES TENNIS OR THROWING A BASEBALL OR FOOTBALL: YES
CAN YOU TAKE CARE OF YOURSELF (EAT, DRESS, BATHE, OR USE TOILET): YES

## 2025-09-04 ASSESSMENT — ENCOUNTER SYMPTOMS
NEUROLOGICAL NEGATIVE: 1
EYES NEGATIVE: 1
RESPIRATORY NEGATIVE: 1
MUSCULOSKELETAL NEGATIVE: 1
CARDIOVASCULAR NEGATIVE: 1
CONSTIPATION: 1
PSYCHIATRIC NEGATIVE: 1
CONSTITUTIONAL NEGATIVE: 1

## 2025-09-12 ENCOUNTER — APPOINTMENT (OUTPATIENT)
Dept: HEMATOLOGY/ONCOLOGY | Facility: CLINIC | Age: 65
End: 2025-09-12
Payer: MEDICARE

## (undated) DEVICE — WATER STERILE, FOR IRRIGATION, 1000ML, W/HANGER

## (undated) DEVICE — Device

## (undated) DEVICE — THUNDERBEAT, 5MM, 10CM, INLINE GRIP

## (undated) DEVICE — TIP, SUCTION, YANKAUER, BULB, ADULT

## (undated) DEVICE — DRAPE, TAPE, ORTHO

## (undated) DEVICE — SUTURE, SILK, 2-0, 30 IN, SH, BLACK

## (undated) DEVICE — STRIP, SKIN CLOSURE, STERI STRIP, REINFORCED, 0.5 X 4 IN

## (undated) DEVICE — SUTURE, VICRYL, 2-0, 27 IN, SH, UNDYED

## (undated) DEVICE — SUTURE, MONOCRYL, 4-0, 27 IN, PS-2, UNDYED

## (undated) DEVICE — GLOVE, SURGICAL, PROTEXIS PI , 6.5, PF, LF

## (undated) DEVICE — SUTURE, PROLENE, 2-0, 30 IN, SH, BLUE

## (undated) DEVICE — SUTURE, VICRYL, 3-0, 27 IN, SH

## (undated) DEVICE — PROBE COVER, INTRAOPERATIVE, 13 X 244CM (5 X 96IN)

## (undated) DEVICE — BLADE, SURGICAL, POLYMER COATED P15, STERILE, DISP

## (undated) DEVICE — DRAPE, LEGGINGS, 48 X 31 IN, STERILE, LF

## (undated) DEVICE — SUTURE, VICRYL, 3-0, 54 IN, CTD, UNDYED

## (undated) DEVICE — STRIP, SKIN CLOSURE, COMPOUND BENZION TINCTURE 0.6ML

## (undated) DEVICE — APPLICATOR, CHLORAPREP, W/ORANGE TINT, 26ML

## (undated) DEVICE — SLEEVE, VASO PRESS, CALF GARMENT, MEDIUM, GREEN

## (undated) DEVICE — ELECTRODE, ELECTROSURGICAL, BLADE, INSULATED, ENT/IMA, STERILE

## (undated) DEVICE — SUTURE, VICRYL, 3-0, 27 IN, SH, VIOLET

## (undated) DEVICE — SUTURE, MONOCRYL PLUS, 4-0, PS-2 UD 27IN

## (undated) DEVICE — SOLUTION, IRRIGATION, STERILE WATER, 1000 ML, POUR BOTTLE